# Patient Record
Sex: FEMALE | Race: WHITE | NOT HISPANIC OR LATINO
[De-identification: names, ages, dates, MRNs, and addresses within clinical notes are randomized per-mention and may not be internally consistent; named-entity substitution may affect disease eponyms.]

---

## 2017-06-09 ENCOUNTER — ASOB RESULT (OUTPATIENT)
Age: 27
End: 2017-06-09

## 2017-06-09 ENCOUNTER — APPOINTMENT (OUTPATIENT)
Dept: ANTEPARTUM | Facility: CLINIC | Age: 27
End: 2017-06-09

## 2017-06-22 ENCOUNTER — APPOINTMENT (OUTPATIENT)
Dept: ANTEPARTUM | Facility: CLINIC | Age: 27
End: 2017-06-22

## 2017-06-22 ENCOUNTER — ASOB RESULT (OUTPATIENT)
Age: 27
End: 2017-06-22

## 2017-07-07 ENCOUNTER — ASOB RESULT (OUTPATIENT)
Age: 27
End: 2017-07-07

## 2017-07-07 ENCOUNTER — APPOINTMENT (OUTPATIENT)
Dept: ANTEPARTUM | Facility: CLINIC | Age: 27
End: 2017-07-07

## 2017-07-21 ENCOUNTER — ASOB RESULT (OUTPATIENT)
Age: 27
End: 2017-07-21

## 2017-07-21 ENCOUNTER — APPOINTMENT (OUTPATIENT)
Dept: ANTEPARTUM | Facility: CLINIC | Age: 27
End: 2017-07-21

## 2017-08-04 ENCOUNTER — ASOB RESULT (OUTPATIENT)
Age: 27
End: 2017-08-04

## 2017-08-04 ENCOUNTER — APPOINTMENT (OUTPATIENT)
Dept: ANTEPARTUM | Facility: CLINIC | Age: 27
End: 2017-08-04
Payer: COMMERCIAL

## 2017-08-04 PROCEDURE — 76811 OB US DETAILED SNGL FETUS: CPT

## 2017-08-21 ENCOUNTER — APPOINTMENT (OUTPATIENT)
Dept: ANTEPARTUM | Facility: CLINIC | Age: 27
End: 2017-08-21
Payer: COMMERCIAL

## 2017-08-21 ENCOUNTER — ASOB RESULT (OUTPATIENT)
Age: 27
End: 2017-08-21

## 2017-08-21 PROCEDURE — 76816 OB US FOLLOW-UP PER FETUS: CPT

## 2017-08-21 PROCEDURE — 76821 MIDDLE CEREBRAL ARTERY ECHO: CPT

## 2017-10-30 ENCOUNTER — ASOB RESULT (OUTPATIENT)
Age: 27
End: 2017-10-30

## 2017-10-30 ENCOUNTER — APPOINTMENT (OUTPATIENT)
Dept: ANTEPARTUM | Facility: CLINIC | Age: 27
End: 2017-10-30
Payer: COMMERCIAL

## 2017-10-30 PROCEDURE — 76821 MIDDLE CEREBRAL ARTERY ECHO: CPT

## 2017-10-30 PROCEDURE — 76816 OB US FOLLOW-UP PER FETUS: CPT

## 2017-11-21 ENCOUNTER — ASOB RESULT (OUTPATIENT)
Age: 27
End: 2017-11-21

## 2017-11-21 ENCOUNTER — APPOINTMENT (OUTPATIENT)
Dept: ANTEPARTUM | Facility: CLINIC | Age: 27
End: 2017-11-21
Payer: COMMERCIAL

## 2017-11-21 PROCEDURE — 76816 OB US FOLLOW-UP PER FETUS: CPT

## 2017-11-21 PROCEDURE — 76818 FETAL BIOPHYS PROFILE W/NST: CPT

## 2017-11-27 ENCOUNTER — APPOINTMENT (OUTPATIENT)
Dept: ANTEPARTUM | Facility: CLINIC | Age: 27
End: 2017-11-27

## 2017-12-12 ENCOUNTER — APPOINTMENT (OUTPATIENT)
Dept: ANTEPARTUM | Facility: CLINIC | Age: 27
End: 2017-12-12
Payer: COMMERCIAL

## 2017-12-12 ENCOUNTER — ASOB RESULT (OUTPATIENT)
Age: 27
End: 2017-12-12

## 2017-12-12 PROCEDURE — 76816 OB US FOLLOW-UP PER FETUS: CPT

## 2017-12-12 PROCEDURE — 76819 FETAL BIOPHYS PROFIL W/O NST: CPT

## 2017-12-16 ENCOUNTER — OUTPATIENT (OUTPATIENT)
Dept: OUTPATIENT SERVICES | Facility: HOSPITAL | Age: 27
LOS: 1 days | End: 2017-12-16
Payer: COMMERCIAL

## 2017-12-16 DIAGNOSIS — O26.899 OTHER SPECIFIED PREGNANCY RELATED CONDITIONS, UNSPECIFIED TRIMESTER: ICD-10-CM

## 2017-12-16 DIAGNOSIS — Z3A.00 WEEKS OF GESTATION OF PREGNANCY NOT SPECIFIED: ICD-10-CM

## 2017-12-16 LAB
ALBUMIN SERPL ELPH-MCNC: 3.3 G/DL — SIGNIFICANT CHANGE UP (ref 3.3–5)
ALP SERPL-CCNC: 97 U/L — SIGNIFICANT CHANGE UP (ref 40–120)
ALT FLD-CCNC: 10 U/L RC — SIGNIFICANT CHANGE UP (ref 10–45)
ANION GAP SERPL CALC-SCNC: 15 MMOL/L — SIGNIFICANT CHANGE UP (ref 5–17)
APPEARANCE UR: CLEAR — SIGNIFICANT CHANGE UP
APTT BLD: 27 SEC — LOW (ref 27.5–37.4)
AST SERPL-CCNC: 16 U/L — SIGNIFICANT CHANGE UP (ref 10–40)
BACTERIA # UR AUTO: ABNORMAL /HPF
BASOPHILS # BLD AUTO: 0 K/UL — SIGNIFICANT CHANGE UP (ref 0–0.2)
BASOPHILS NFR BLD AUTO: 0.3 % — SIGNIFICANT CHANGE UP (ref 0–2)
BILIRUB SERPL-MCNC: 0.1 MG/DL — LOW (ref 0.2–1.2)
BILIRUB UR-MCNC: NEGATIVE — SIGNIFICANT CHANGE UP
BUN SERPL-MCNC: 8 MG/DL — SIGNIFICANT CHANGE UP (ref 7–23)
CALCIUM SERPL-MCNC: 8.7 MG/DL — SIGNIFICANT CHANGE UP (ref 8.4–10.5)
CHLORIDE SERPL-SCNC: 104 MMOL/L — SIGNIFICANT CHANGE UP (ref 96–108)
CO2 SERPL-SCNC: 21 MMOL/L — LOW (ref 22–31)
COLOR SPEC: YELLOW — SIGNIFICANT CHANGE UP
CREAT SERPL-MCNC: 0.66 MG/DL — SIGNIFICANT CHANGE UP (ref 0.5–1.3)
DIFF PNL FLD: NEGATIVE — SIGNIFICANT CHANGE UP
EOSINOPHIL # BLD AUTO: 0.1 K/UL — SIGNIFICANT CHANGE UP (ref 0–0.5)
EOSINOPHIL NFR BLD AUTO: 0.9 % — SIGNIFICANT CHANGE UP (ref 0–6)
EPI CELLS # UR: SIGNIFICANT CHANGE UP /HPF
FIBRINOGEN PPP-MCNC: 900 MG/DL — HIGH (ref 310–510)
GLUCOSE SERPL-MCNC: 85 MG/DL — SIGNIFICANT CHANGE UP (ref 70–99)
GLUCOSE UR QL: NEGATIVE — SIGNIFICANT CHANGE UP
HCT VFR BLD CALC: 31 % — LOW (ref 34.5–45)
HGB BLD-MCNC: 10.5 G/DL — LOW (ref 11.5–15.5)
INR BLD: 1.15 RATIO — SIGNIFICANT CHANGE UP (ref 0.88–1.16)
KETONES UR-MCNC: NEGATIVE — SIGNIFICANT CHANGE UP
LDH SERPL L TO P-CCNC: 159 U/L — SIGNIFICANT CHANGE UP (ref 50–242)
LEUKOCYTE ESTERASE UR-ACNC: ABNORMAL
LYMPHOCYTES # BLD AUTO: 16.9 % — SIGNIFICANT CHANGE UP (ref 13–44)
LYMPHOCYTES # BLD AUTO: 2 K/UL — SIGNIFICANT CHANGE UP (ref 1–3.3)
MCHC RBC-ENTMCNC: 26 PG — LOW (ref 27–34)
MCHC RBC-ENTMCNC: 33.9 GM/DL — SIGNIFICANT CHANGE UP (ref 32–36)
MCV RBC AUTO: 76.6 FL — LOW (ref 80–100)
MONOCYTES # BLD AUTO: 1 K/UL — HIGH (ref 0–0.9)
MONOCYTES NFR BLD AUTO: 8.2 % — SIGNIFICANT CHANGE UP (ref 2–14)
NEUTROPHILS # BLD AUTO: 8.6 K/UL — HIGH (ref 1.8–7.4)
NEUTROPHILS NFR BLD AUTO: 73.7 % — SIGNIFICANT CHANGE UP (ref 43–77)
NITRITE UR-MCNC: NEGATIVE — SIGNIFICANT CHANGE UP
PH UR: 6.5 — SIGNIFICANT CHANGE UP (ref 5–8)
PLATELET # BLD AUTO: 279 K/UL — SIGNIFICANT CHANGE UP (ref 150–400)
POTASSIUM SERPL-MCNC: 3.8 MMOL/L — SIGNIFICANT CHANGE UP (ref 3.5–5.3)
POTASSIUM SERPL-SCNC: 3.8 MMOL/L — SIGNIFICANT CHANGE UP (ref 3.5–5.3)
PROT SERPL-MCNC: 6.6 G/DL — SIGNIFICANT CHANGE UP (ref 6–8.3)
PROT UR-MCNC: SIGNIFICANT CHANGE UP
PROTHROM AB SERPL-ACNC: 12.6 SEC — SIGNIFICANT CHANGE UP (ref 9.8–12.7)
RBC # BLD: 4.05 M/UL — SIGNIFICANT CHANGE UP (ref 3.8–5.2)
RBC # FLD: 14 % — SIGNIFICANT CHANGE UP (ref 10.3–14.5)
RBC CASTS # UR COMP ASSIST: ABNORMAL /HPF (ref 0–2)
SODIUM SERPL-SCNC: 140 MMOL/L — SIGNIFICANT CHANGE UP (ref 135–145)
SP GR SPEC: 1.01 — SIGNIFICANT CHANGE UP (ref 1.01–1.02)
URATE SERPL-MCNC: 5.2 MG/DL — SIGNIFICANT CHANGE UP (ref 2.5–7)
UROBILINOGEN FLD QL: NEGATIVE — SIGNIFICANT CHANGE UP
WBC # BLD: 11.7 K/UL — HIGH (ref 3.8–10.5)
WBC # FLD AUTO: 11.7 K/UL — HIGH (ref 3.8–10.5)
WBC UR QL: ABNORMAL /HPF (ref 0–5)

## 2017-12-16 PROCEDURE — 83615 LACTATE (LD) (LDH) ENZYME: CPT

## 2017-12-16 PROCEDURE — 85027 COMPLETE CBC AUTOMATED: CPT

## 2017-12-16 PROCEDURE — 84156 ASSAY OF PROTEIN URINE: CPT

## 2017-12-16 PROCEDURE — G0463: CPT

## 2017-12-16 PROCEDURE — 59025 FETAL NON-STRESS TEST: CPT

## 2017-12-16 PROCEDURE — 81001 URINALYSIS AUTO W/SCOPE: CPT

## 2017-12-16 PROCEDURE — 80053 COMPREHEN METABOLIC PANEL: CPT

## 2017-12-16 PROCEDURE — 85384 FIBRINOGEN ACTIVITY: CPT

## 2017-12-16 PROCEDURE — 85610 PROTHROMBIN TIME: CPT

## 2017-12-16 PROCEDURE — 85730 THROMBOPLASTIN TIME PARTIAL: CPT

## 2017-12-16 PROCEDURE — 84550 ASSAY OF BLOOD/URIC ACID: CPT

## 2017-12-17 ENCOUNTER — INPATIENT (INPATIENT)
Facility: HOSPITAL | Age: 27
LOS: 2 days | Discharge: HOME | End: 2017-12-20
Attending: OBSTETRICS & GYNECOLOGY | Admitting: OBSTETRICS & GYNECOLOGY
Payer: COMMERCIAL

## 2017-12-17 DIAGNOSIS — Z34.83 ENCOUNTER FOR SUPERVISION OF OTHER NORMAL PREGNANCY, THIRD TRIMESTER: ICD-10-CM

## 2017-12-17 LAB
CREAT ?TM UR-MCNC: 79 MG/DL — SIGNIFICANT CHANGE UP
PROT ?TM UR-MCNC: 10 MG/DL — SIGNIFICANT CHANGE UP (ref 0–12)
PROT/CREAT UR-RTO: 0.1 RATIO — SIGNIFICANT CHANGE UP (ref 0–0.2)

## 2017-12-17 RX ORDER — CITRIC ACID/SODIUM CITRATE 300-500 MG
15 SOLUTION, ORAL ORAL EVERY 4 HOURS
Qty: 0 | Refills: 0 | Status: DISCONTINUED | OUTPATIENT
Start: 2017-12-17 | End: 2017-12-18

## 2017-12-17 RX ORDER — SODIUM CHLORIDE 9 MG/ML
2500 INJECTION, SOLUTION INTRAVENOUS ONCE
Qty: 0 | Refills: 0 | Status: COMPLETED | OUTPATIENT
Start: 2017-12-17 | End: 2017-12-17

## 2017-12-17 RX ORDER — SODIUM CHLORIDE 9 MG/ML
1000 INJECTION, SOLUTION INTRAVENOUS
Qty: 0 | Refills: 0 | Status: DISCONTINUED | OUTPATIENT
Start: 2017-12-17 | End: 2017-12-18

## 2017-12-17 RX ORDER — OXYTOCIN 10 UNIT/ML
333.33 VIAL (ML) INJECTION
Qty: 20 | Refills: 0 | Status: COMPLETED | OUTPATIENT
Start: 2017-12-17

## 2017-12-17 RX ORDER — SODIUM CHLORIDE 9 MG/ML
1000 INJECTION, SOLUTION INTRAVENOUS
Qty: 0 | Refills: 0 | Status: DISCONTINUED | OUTPATIENT
Start: 2017-12-17 | End: 2017-12-20

## 2017-12-17 RX ADMIN — SODIUM CHLORIDE 300 MILLILITER(S): 9 INJECTION, SOLUTION INTRAVENOUS at 23:00

## 2017-12-18 VITALS — HEIGHT: 63 IN | WEIGHT: 202.83 LBS

## 2017-12-18 LAB
BLD GP AB SCN SERPL QL: NEGATIVE — SIGNIFICANT CHANGE UP
HCT VFR BLD CALC: 28.1 % — LOW (ref 34.5–45)
HGB BLD-MCNC: 9.5 G/DL — LOW (ref 11.5–15.5)
MCHC RBC-ENTMCNC: 26.1 PG — LOW (ref 27–34)
MCHC RBC-ENTMCNC: 33.9 GM/DL — SIGNIFICANT CHANGE UP (ref 32–36)
MCV RBC AUTO: 77.1 FL — LOW (ref 80–100)
PLATELET # BLD AUTO: 238 K/UL — SIGNIFICANT CHANGE UP (ref 150–400)
RBC # BLD: 3.64 M/UL — LOW (ref 3.8–5.2)
RBC # FLD: 14.2 % — SIGNIFICANT CHANGE UP (ref 10.3–14.5)
RH IG SCN BLD-IMP: NEGATIVE — SIGNIFICANT CHANGE UP
T PALLIDUM AB TITR SER: NEGATIVE — SIGNIFICANT CHANGE UP
WBC # BLD: 11.8 K/UL — HIGH (ref 3.8–10.5)
WBC # FLD AUTO: 11.8 K/UL — HIGH (ref 3.8–10.5)

## 2017-12-18 RX ORDER — HYDROCORTISONE 1 %
1 OINTMENT (GRAM) TOPICAL EVERY 4 HOURS
Qty: 0 | Refills: 0 | Status: DISCONTINUED | OUTPATIENT
Start: 2017-12-18 | End: 2017-12-20

## 2017-12-18 RX ORDER — MAGNESIUM HYDROXIDE 400 MG/1
30 TABLET, CHEWABLE ORAL
Qty: 0 | Refills: 0 | Status: DISCONTINUED | OUTPATIENT
Start: 2017-12-18 | End: 2017-12-20

## 2017-12-18 RX ORDER — OXYCODONE HYDROCHLORIDE 5 MG/1
5 TABLET ORAL
Qty: 0 | Refills: 0 | Status: DISCONTINUED | OUTPATIENT
Start: 2017-12-18 | End: 2017-12-20

## 2017-12-18 RX ORDER — OXYTOCIN 10 UNIT/ML
41.67 VIAL (ML) INJECTION
Qty: 20 | Refills: 0 | Status: DISCONTINUED | OUTPATIENT
Start: 2017-12-18 | End: 2017-12-18

## 2017-12-18 RX ORDER — IBUPROFEN 200 MG
600 TABLET ORAL EVERY 6 HOURS
Qty: 0 | Refills: 0 | Status: DISCONTINUED | OUTPATIENT
Start: 2017-12-18 | End: 2017-12-20

## 2017-12-18 RX ORDER — OXYTOCIN 10 UNIT/ML
4 VIAL (ML) INJECTION
Qty: 30 | Refills: 0 | Status: DISCONTINUED | OUTPATIENT
Start: 2017-12-18 | End: 2017-12-20

## 2017-12-18 RX ORDER — SODIUM CHLORIDE 9 MG/ML
3 INJECTION INTRAMUSCULAR; INTRAVENOUS; SUBCUTANEOUS EVERY 8 HOURS
Qty: 0 | Refills: 0 | Status: DISCONTINUED | OUTPATIENT
Start: 2017-12-18 | End: 2017-12-18

## 2017-12-18 RX ORDER — TETANUS TOXOID, REDUCED DIPHTHERIA TOXOID AND ACELLULAR PERTUSSIS VACCINE, ADSORBED 5; 2.5; 8; 8; 2.5 [IU]/.5ML; [IU]/.5ML; UG/.5ML; UG/.5ML; UG/.5ML
0.5 SUSPENSION INTRAMUSCULAR ONCE
Qty: 0 | Refills: 0 | Status: DISCONTINUED | OUTPATIENT
Start: 2017-12-18 | End: 2017-12-20

## 2017-12-18 RX ORDER — DIBUCAINE 1 %
1 OINTMENT (GRAM) RECTAL EVERY 4 HOURS
Qty: 0 | Refills: 0 | Status: DISCONTINUED | OUTPATIENT
Start: 2017-12-18 | End: 2017-12-18

## 2017-12-18 RX ORDER — LANOLIN
1 OINTMENT (GRAM) TOPICAL EVERY 6 HOURS
Qty: 0 | Refills: 0 | Status: DISCONTINUED | OUTPATIENT
Start: 2017-12-18 | End: 2017-12-20

## 2017-12-18 RX ORDER — GLYCERIN ADULT
1 SUPPOSITORY, RECTAL RECTAL AT BEDTIME
Qty: 0 | Refills: 0 | Status: DISCONTINUED | OUTPATIENT
Start: 2017-12-18 | End: 2017-12-20

## 2017-12-18 RX ORDER — DIPHENHYDRAMINE HCL 50 MG
25 CAPSULE ORAL EVERY 6 HOURS
Qty: 0 | Refills: 0 | Status: DISCONTINUED | OUTPATIENT
Start: 2017-12-18 | End: 2017-12-20

## 2017-12-18 RX ORDER — KETOROLAC TROMETHAMINE 30 MG/ML
30 SYRINGE (ML) INJECTION ONCE
Qty: 0 | Refills: 0 | Status: DISCONTINUED | OUTPATIENT
Start: 2017-12-18 | End: 2017-12-20

## 2017-12-18 RX ORDER — ACETAMINOPHEN 500 MG
975 TABLET ORAL EVERY 6 HOURS
Qty: 0 | Refills: 0 | Status: DISCONTINUED | OUTPATIENT
Start: 2017-12-18 | End: 2017-12-20

## 2017-12-18 RX ORDER — OXYTOCIN 10 UNIT/ML
333.33 VIAL (ML) INJECTION
Qty: 20 | Refills: 0 | Status: DISCONTINUED | OUTPATIENT
Start: 2017-12-18 | End: 2017-12-20

## 2017-12-18 RX ORDER — SIMETHICONE 80 MG/1
80 TABLET, CHEWABLE ORAL EVERY 6 HOURS
Qty: 0 | Refills: 0 | Status: DISCONTINUED | OUTPATIENT
Start: 2017-12-18 | End: 2017-12-20

## 2017-12-18 RX ORDER — SODIUM CHLORIDE 9 MG/ML
3 INJECTION INTRAMUSCULAR; INTRAVENOUS; SUBCUTANEOUS EVERY 8 HOURS
Qty: 0 | Refills: 0 | Status: DISCONTINUED | OUTPATIENT
Start: 2017-12-18 | End: 2017-12-20

## 2017-12-18 RX ORDER — OXYCODONE HYDROCHLORIDE 5 MG/1
5 TABLET ORAL EVERY 4 HOURS
Qty: 0 | Refills: 0 | Status: DISCONTINUED | OUTPATIENT
Start: 2017-12-18 | End: 2017-12-20

## 2017-12-18 RX ORDER — IBUPROFEN 200 MG
600 TABLET ORAL EVERY 6 HOURS
Qty: 0 | Refills: 0 | Status: COMPLETED | OUTPATIENT
Start: 2017-12-18 | End: 2018-11-16

## 2017-12-18 RX ORDER — ACETAMINOPHEN 500 MG
975 TABLET ORAL EVERY 6 HOURS
Qty: 0 | Refills: 0 | Status: COMPLETED | OUTPATIENT
Start: 2017-12-18 | End: 2018-11-16

## 2017-12-18 RX ORDER — PRAMOXINE HYDROCHLORIDE 150 MG/15G
1 AEROSOL, FOAM RECTAL EVERY 4 HOURS
Qty: 0 | Refills: 0 | Status: DISCONTINUED | OUTPATIENT
Start: 2017-12-18 | End: 2017-12-20

## 2017-12-18 RX ORDER — PRAMOXINE HYDROCHLORIDE 150 MG/15G
1 AEROSOL, FOAM RECTAL EVERY 4 HOURS
Qty: 0 | Refills: 0 | Status: DISCONTINUED | OUTPATIENT
Start: 2017-12-18 | End: 2017-12-18

## 2017-12-18 RX ORDER — DIBUCAINE 1 %
1 OINTMENT (GRAM) RECTAL EVERY 4 HOURS
Qty: 0 | Refills: 0 | Status: DISCONTINUED | OUTPATIENT
Start: 2017-12-18 | End: 2017-12-20

## 2017-12-18 RX ORDER — DOCUSATE SODIUM 100 MG
100 CAPSULE ORAL
Qty: 0 | Refills: 0 | Status: DISCONTINUED | OUTPATIENT
Start: 2017-12-18 | End: 2017-12-20

## 2017-12-18 RX ORDER — HYDROCORTISONE 1 %
1 OINTMENT (GRAM) TOPICAL EVERY 4 HOURS
Qty: 0 | Refills: 0 | Status: DISCONTINUED | OUTPATIENT
Start: 2017-12-18 | End: 2017-12-18

## 2017-12-18 RX ORDER — OXYTOCIN 10 UNIT/ML
41.67 VIAL (ML) INJECTION
Qty: 20 | Refills: 0 | Status: DISCONTINUED | OUTPATIENT
Start: 2017-12-18 | End: 2017-12-20

## 2017-12-18 RX ORDER — AER TRAVELER 0.5 G/1
1 SOLUTION RECTAL; TOPICAL EVERY 4 HOURS
Qty: 0 | Refills: 0 | Status: DISCONTINUED | OUTPATIENT
Start: 2017-12-18 | End: 2017-12-20

## 2017-12-18 RX ORDER — AER TRAVELER 0.5 G/1
1 SOLUTION RECTAL; TOPICAL EVERY 4 HOURS
Qty: 0 | Refills: 0 | Status: DISCONTINUED | OUTPATIENT
Start: 2017-12-18 | End: 2017-12-18

## 2017-12-18 RX ADMIN — SODIUM CHLORIDE 125 MILLILITER(S): 9 INJECTION, SOLUTION INTRAVENOUS at 04:52

## 2017-12-18 RX ADMIN — Medication 600 MILLIGRAM(S): at 20:39

## 2017-12-18 RX ADMIN — Medication 4 MILLIUNIT(S)/MIN: at 03:24

## 2017-12-18 RX ADMIN — Medication 600 MILLIGRAM(S): at 21:09

## 2017-12-18 RX ADMIN — Medication 975 MILLIGRAM(S): at 22:45

## 2017-12-18 RX ADMIN — Medication 975 MILLIGRAM(S): at 23:15

## 2017-12-19 LAB
HCT VFR BLD CALC: 31 % — LOW (ref 34.5–45)
HGB BLD-MCNC: 9.7 G/DL — LOW (ref 11.5–15.5)
KLEIHAUER-BETKE CALCULATION: 0 % — SIGNIFICANT CHANGE UP (ref 0–0.3)

## 2017-12-19 PROCEDURE — 86901 BLOOD TYPING SEROLOGIC RH(D): CPT

## 2017-12-19 PROCEDURE — 86900 BLOOD TYPING SEROLOGIC ABO: CPT

## 2017-12-19 PROCEDURE — 86850 RBC ANTIBODY SCREEN: CPT

## 2017-12-19 PROCEDURE — 59025 FETAL NON-STRESS TEST: CPT

## 2017-12-19 PROCEDURE — 59050 FETAL MONITOR W/REPORT: CPT

## 2017-12-19 PROCEDURE — 85460 HEMOGLOBIN FETAL: CPT

## 2017-12-19 PROCEDURE — 86780 TREPONEMA PALLIDUM: CPT

## 2017-12-19 PROCEDURE — 85027 COMPLETE CBC AUTOMATED: CPT

## 2017-12-19 PROCEDURE — 85018 HEMOGLOBIN: CPT

## 2017-12-19 RX ADMIN — Medication 600 MILLIGRAM(S): at 10:33

## 2017-12-19 RX ADMIN — Medication 1 TABLET(S): at 12:14

## 2017-12-19 RX ADMIN — Medication 600 MILLIGRAM(S): at 11:15

## 2017-12-19 RX ADMIN — Medication 975 MILLIGRAM(S): at 08:15

## 2017-12-19 RX ADMIN — Medication 975 MILLIGRAM(S): at 07:36

## 2017-12-19 NOTE — PROGRESS NOTE ADULT - ASSESSMENT
A/P:  27y  PPD # 1   S/P     Doing Well    PMHx: cerivcal dysplasia  Current Issues: A/P:  27y  PPD # 1   S/P     Doing Well    PMHx: cerivcal dysplasia  Current Issues: Rh neg, Babt Rh pos, check KB, for RHogam

## 2017-12-19 NOTE — PROGRESS NOTE ADULT - SUBJECTIVE AND OBJECTIVE BOX
Postpartum Note- PPD#1    Prenatal Labs  Blood type: A Negative  Rubella IgG: IMM  RPR: Negative        S:Patient w/o complaints, pain is controlled.    Pt is OOB, tolerating PO, voiding. Lochia WNL.     O:  Vital Signs Last 24 Hrs  T(C): 36.6 (19 Dec 2017 05:46), Max: 36.9 (18 Dec 2017 17:45)  T(F): 97.9 (19 Dec 2017 05:46), Max: 98.4 (18 Dec 2017 17:45)  HR: 70 (19 Dec 2017 05:46) (70 - 82)  BP: 101/69 (19 Dec 2017 05:46) (1/- - 131/70)  BP(mean): 92 (18 Dec 2017 15:45) (85 - 92)  RR: 18 (19 Dec 2017 05:46) (16 - 18)  SpO2: 99% (19 Dec 2017 05:46) (96% - 99%)     Gen: NAD  Abdomen: Soft, nontender, non-distended, fundus firm.  Vaginal: Lochia WNL,    Ext:  Neg calf tenderness    LABS:    Hemoglobin: 9.5 g/dL (12-18 @ 00:28)      Hematocrit: 28.1 % (12-18 @ 00:28)

## 2017-12-20 ENCOUNTER — TRANSCRIPTION ENCOUNTER (OUTPATIENT)
Age: 27
End: 2017-12-20

## 2017-12-20 VITALS
SYSTOLIC BLOOD PRESSURE: 128 MMHG | HEART RATE: 76 BPM | DIASTOLIC BLOOD PRESSURE: 81 MMHG | TEMPERATURE: 98 F | RESPIRATION RATE: 18 BRPM

## 2017-12-20 RX ORDER — DOCUSATE SODIUM 100 MG
1 CAPSULE ORAL
Qty: 0 | Refills: 0 | DISCHARGE
Start: 2017-12-20

## 2017-12-20 RX ORDER — IBUPROFEN 200 MG
1 TABLET ORAL
Qty: 0 | Refills: 0 | DISCHARGE
Start: 2017-12-20

## 2017-12-20 RX ORDER — SIMETHICONE 80 MG/1
1 TABLET, CHEWABLE ORAL
Qty: 0 | Refills: 0 | DISCHARGE
Start: 2017-12-20

## 2017-12-20 RX ORDER — ACETAMINOPHEN 500 MG
3 TABLET ORAL
Qty: 0 | Refills: 0 | DISCHARGE
Start: 2017-12-20

## 2017-12-20 NOTE — DISCHARGE NOTE OB - HOSPITAL COURSE
Pt had an uncomplicated vaginal delivery; Pt did well postpartum; she was voiding, ambulating and tolerating regular diet; she remained afebrile and her pain was well controlled  Pt was discharged on PPD 2 in stable condition

## 2017-12-20 NOTE — DISCHARGE NOTE OB - PLAN OF CARE
recovery diet and activity as discussed and tolerated; please call office to schedule postpartum visit 5 weeks after delivery or earlier if needed

## 2017-12-20 NOTE — DISCHARGE NOTE OB - CARE PLAN
Principal Discharge DX:	Vaginal delivery  Goal:	recovery  Instructions for follow-up, activity and diet:	diet and activity as discussed and tolerated; please call office to schedule postpartum visit 5 weeks after delivery or earlier if needed

## 2017-12-20 NOTE — PROGRESS NOTE ADULT - SUBJECTIVE AND OBJECTIVE BOX
S: Patient doing well. Minimal lochia. Pain controlled    O: Vital Signs Last 24 Hrs  T(C): 36.7 (20 Dec 2017 05:00), Max: 36.7 (19 Dec 2017 17:44)  T(F): 98.1 (20 Dec 2017 05:00), Max: 98.1 (20 Dec 2017 05:00)  HR: 76 (20 Dec 2017 05:00) (66 - 76)  BP: 128/81 (20 Dec 2017 05:00) (118/81 - 128/81)  BP(mean): --  RR: 18 (20 Dec 2017 05:00) (16 - 18)  SpO2: --    Gen: NAD  Abd: soft, NT, ND, fundus firm below umbilicus  Lochia: moderate  Ext: no tenderness    Labs:                        9.7    x     )-----------( x        ( 19 Dec 2017 08:49 )             31.0       A: 27y PPD#2 s/p  doing well.    Plan:  Pt doing well on PPD 2  routine PP care  discharge planning

## 2017-12-20 NOTE — DISCHARGE NOTE OB - CARE PROVIDER_API CALL
Vish Patel), Obstetrics and Gynecology  [address] [place] [address]  Phone: [phone]  Fax: [phone]

## 2020-03-19 ENCOUNTER — TRANSCRIPTION ENCOUNTER (OUTPATIENT)
Age: 30
End: 2020-03-19

## 2020-03-19 ENCOUNTER — OUTPATIENT (OUTPATIENT)
Dept: OUTPATIENT SERVICES | Facility: HOSPITAL | Age: 30
LOS: 1 days | End: 2020-03-19
Payer: COMMERCIAL

## 2020-03-19 VITALS
WEIGHT: 194.01 LBS | TEMPERATURE: 99 F | OXYGEN SATURATION: 99 % | DIASTOLIC BLOOD PRESSURE: 74 MMHG | HEART RATE: 86 BPM | SYSTOLIC BLOOD PRESSURE: 112 MMHG | RESPIRATION RATE: 16 BRPM | HEIGHT: 64 IN

## 2020-03-19 DIAGNOSIS — O02.1 MISSED ABORTION: ICD-10-CM

## 2020-03-19 LAB
BLD GP AB SCN SERPL QL: NEGATIVE — SIGNIFICANT CHANGE UP
HCT VFR BLD CALC: 36.9 % — SIGNIFICANT CHANGE UP (ref 34.5–45)
HGB BLD-MCNC: 11.7 G/DL — SIGNIFICANT CHANGE UP (ref 11.5–15.5)
MCHC RBC-ENTMCNC: 25.6 PG — LOW (ref 27–34)
MCHC RBC-ENTMCNC: 31.7 GM/DL — LOW (ref 32–36)
MCV RBC AUTO: 80.7 FL — SIGNIFICANT CHANGE UP (ref 80–100)
NRBC # BLD: 0 /100 WBCS — SIGNIFICANT CHANGE UP (ref 0–0)
PLATELET # BLD AUTO: 321 K/UL — SIGNIFICANT CHANGE UP (ref 150–400)
RBC # BLD: 4.57 M/UL — SIGNIFICANT CHANGE UP (ref 3.8–5.2)
RBC # FLD: 14.2 % — SIGNIFICANT CHANGE UP (ref 10.3–14.5)
RH IG SCN BLD-IMP: NEGATIVE — SIGNIFICANT CHANGE UP
WBC # BLD: 11.14 K/UL — HIGH (ref 3.8–10.5)
WBC # FLD AUTO: 11.14 K/UL — HIGH (ref 3.8–10.5)

## 2020-03-19 PROCEDURE — 86900 BLOOD TYPING SEROLOGIC ABO: CPT

## 2020-03-19 PROCEDURE — 86850 RBC ANTIBODY SCREEN: CPT

## 2020-03-19 PROCEDURE — 85027 COMPLETE CBC AUTOMATED: CPT

## 2020-03-19 PROCEDURE — G0463: CPT

## 2020-03-19 PROCEDURE — 86901 BLOOD TYPING SEROLOGIC RH(D): CPT

## 2020-03-19 RX ORDER — LIDOCAINE HCL 20 MG/ML
0.2 VIAL (ML) INJECTION ONCE
Refills: 0 | Status: DISCONTINUED | OUTPATIENT
Start: 2020-03-20 | End: 2020-04-04

## 2020-03-19 RX ORDER — SODIUM CHLORIDE 9 MG/ML
3 INJECTION INTRAMUSCULAR; INTRAVENOUS; SUBCUTANEOUS EVERY 8 HOURS
Refills: 0 | Status: DISCONTINUED | OUTPATIENT
Start: 2020-03-20 | End: 2020-04-04

## 2020-03-19 NOTE — H&P PST ADULT - NSANTHOSAYNRD_GEN_A_CORE
No. YOANDY screening performed.  STOP BANG Legend: 0-2 = LOW Risk; 3-4 = INTERMEDIATE Risk; 5-8 = HIGH Risk

## 2020-03-19 NOTE — H&P PST ADULT - HISTORY OF PRESENT ILLNESS
30 yo 30 yo presenting @ 10 weeks of gestation- had OB evaluation- s/p pelvic sonogram revealed absence of FHR- missed . Pt scheduled for D&C for missed  on 2020. 28 yo F presenting @ 10 weeks of gestation- had OB evaluation- s/p pelvic sonogram revealed absence of FHR- missed . Pt scheduled for D&C for missed  on 2020.

## 2020-03-19 NOTE — H&P PST ADULT - ATTENDING COMMENTS
Ob/Gyn Attg  Pt is scheduled to undergo D+C/vacuum curettage for missed ab in first trimester.  Pt was counseled re op risks and benefits and poss complications.  Specifically, risks of infection, excessive bleeding, uterine perforation, injury to the cervix, uterus or intra-abd organs or risk of incomplete empyting of uterine contents and need for further surgery or treatment discussed.  Also discussed postop mgmt, pain mgmt, recovery.  all q's answered.  Pt understands and accepts.

## 2020-03-19 NOTE — H&P PST ADULT - VISION (WITH CORRECTIVE LENSES IF THE PATIENT USUALLY WEARS THEM):
Normal vision: sees adequately in most situations; can see medication labels, newsprint/wears contact glasses

## 2020-03-19 NOTE — H&P PST ADULT - NSICDXFAMILYHX_GEN_ALL_CORE_FT
FAMILY HISTORY:  Family history of colon cancer in mother  Family history of hypertension  Family history of polycystic kidney disease

## 2020-03-19 NOTE — H&P PST ADULT - NSICDXPROBLEM_GEN_ALL_CORE_FT
PROBLEM DIAGNOSES  Problem: Missed   Assessment and Plan: Dilation & suction curettage for missed    Labs- CBC, T&S

## 2020-03-20 ENCOUNTER — OUTPATIENT (OUTPATIENT)
Dept: OUTPATIENT SERVICES | Facility: HOSPITAL | Age: 30
LOS: 1 days | End: 2020-03-20
Payer: COMMERCIAL

## 2020-03-20 VITALS
RESPIRATION RATE: 14 BRPM | SYSTOLIC BLOOD PRESSURE: 110 MMHG | OXYGEN SATURATION: 98 % | DIASTOLIC BLOOD PRESSURE: 71 MMHG | HEART RATE: 75 BPM

## 2020-03-20 VITALS
HEART RATE: 76 BPM | OXYGEN SATURATION: 100 % | RESPIRATION RATE: 16 BRPM | SYSTOLIC BLOOD PRESSURE: 109 MMHG | HEIGHT: 64 IN | WEIGHT: 194.01 LBS | DIASTOLIC BLOOD PRESSURE: 71 MMHG | TEMPERATURE: 99 F

## 2020-03-20 DIAGNOSIS — O02.1 MISSED ABORTION: ICD-10-CM

## 2020-03-20 DIAGNOSIS — Z98.890 OTHER SPECIFIED POSTPROCEDURAL STATES: Chronic | ICD-10-CM

## 2020-03-20 PROCEDURE — 88305 TISSUE EXAM BY PATHOLOGIST: CPT | Mod: 26

## 2020-03-20 PROCEDURE — 88305 TISSUE EXAM BY PATHOLOGIST: CPT

## 2020-03-20 PROCEDURE — 88264 CHROMOSOME ANALYSIS 20-25: CPT

## 2020-03-20 PROCEDURE — 59820 CARE OF MISCARRIAGE: CPT

## 2020-03-20 PROCEDURE — 88280 CHROMOSOME KARYOTYPE STUDY: CPT

## 2020-03-20 PROCEDURE — 88233 TISSUE CULTURE SKIN/BIOPSY: CPT

## 2020-03-20 RX ORDER — IBUPROFEN 200 MG
3 TABLET ORAL
Qty: 0 | Refills: 0 | DISCHARGE

## 2020-03-20 RX ORDER — OXYCODONE HYDROCHLORIDE 5 MG/1
5 TABLET ORAL ONCE
Refills: 0 | Status: DISCONTINUED | OUTPATIENT
Start: 2020-03-20 | End: 2020-03-20

## 2020-03-20 RX ORDER — ACETAMINOPHEN 500 MG
1000 TABLET ORAL ONCE
Refills: 0 | Status: COMPLETED | OUTPATIENT
Start: 2020-03-20 | End: 2020-03-20

## 2020-03-20 RX ORDER — CELECOXIB 200 MG/1
200 CAPSULE ORAL ONCE
Refills: 0 | Status: COMPLETED | OUTPATIENT
Start: 2020-03-20 | End: 2020-03-20

## 2020-03-20 RX ORDER — ONDANSETRON 8 MG/1
4 TABLET, FILM COATED ORAL ONCE
Refills: 0 | Status: DISCONTINUED | OUTPATIENT
Start: 2020-03-20 | End: 2020-04-04

## 2020-03-20 RX ORDER — SODIUM CHLORIDE 9 MG/ML
1000 INJECTION, SOLUTION INTRAVENOUS
Refills: 0 | Status: DISCONTINUED | OUTPATIENT
Start: 2020-03-20 | End: 2020-04-04

## 2020-03-20 RX ORDER — FENTANYL CITRATE 50 UG/ML
25 INJECTION INTRAVENOUS
Refills: 0 | Status: DISCONTINUED | OUTPATIENT
Start: 2020-03-20 | End: 2020-03-20

## 2020-03-20 RX ADMIN — Medication 1000 MILLIGRAM(S): at 06:25

## 2020-03-20 RX ADMIN — SODIUM CHLORIDE 100 MILLILITER(S): 9 INJECTION, SOLUTION INTRAVENOUS at 08:08

## 2020-03-20 RX ADMIN — CELECOXIB 200 MILLIGRAM(S): 200 CAPSULE ORAL at 06:24

## 2020-03-20 NOTE — ASU DISCHARGE PLAN (ADULT/PEDIATRIC) - CARE PROVIDER_API CALL
Santos Giraldo)  Obstetrics and Gynecology  [address] [facility], [address]  [place] [address]  Phone: [phone]  Fax: [phone]  Follow Up Time:

## 2020-03-20 NOTE — ASU DISCHARGE PLAN (ADULT/PEDIATRIC) - ASU DISCHARGE DATE/TIME
Patient pleasant upon approach  Primarily [place] but does understand some [place]  Denies anxiety, depression, S/HI, A/VH and pain  Med and meal compliant  Visible and social with peers on the unit  Will continue to monitor and provide therapeutic support  20-Mar-2020 08:09

## 2020-03-20 NOTE — BRIEF OPERATIVE NOTE - OPERATION/FINDINGS
7wk size uterus.  appropriate amount of POC's for 6-7 wks seen passing through the suction tubing, no adnexal mass palpable

## 2020-03-20 NOTE — BRIEF OPERATIVE NOTE - NSICDXBRIEFPROCEDURE_GEN_ALL_CORE_FT
PROCEDURES:  D&C, uterus, therapeutic, for incomplete, missed, septic, or induced  20-Mar-2020 07:48:36  Santos Giraldo

## 2020-03-20 NOTE — ASU DISCHARGE PLAN (ADULT/PEDIATRIC) - CALL YOUR DOCTOR IF YOU HAVE ANY OF THE FOLLOWING:
Pain not relieved by Medications/Swelling that gets worse/Unable to urinate/Bleeding that does not stop/Fever greater than (need to indicate Fahrenheit or Celsius)/Inability to tolerate liquids or foods/Increased irritability or sluggishness

## 2020-03-20 NOTE — ASU PATIENT PROFILE, ADULT - VISION (WITH CORRECTIVE LENSES IF THE PATIENT USUALLY WEARS THEM):
wears contact glasses/Normal vision: sees adequately in most situations; can see medication labels, newsprint

## 2020-03-20 NOTE — ASU DISCHARGE PLAN (ADULT/PEDIATRIC) - ACTIVITY LEVEL
No excercise/No intercourse/No tub baths/No douching/No heavy lifting/No sports/gym/Nothing per vagina/No tampons

## 2020-03-23 LAB — SURGICAL PATHOLOGY STUDY: SIGNIFICANT CHANGE UP

## 2020-04-06 LAB — CHROM ANALY OVERALL INTERP SPEC-IMP: SIGNIFICANT CHANGE UP

## 2020-08-25 PROBLEM — F41.8 OTHER SPECIFIED ANXIETY DISORDERS: Chronic | Status: ACTIVE | Noted: 2020-03-19

## 2020-09-18 ENCOUNTER — ASOB RESULT (OUTPATIENT)
Age: 30
End: 2020-09-18

## 2020-09-18 ENCOUNTER — APPOINTMENT (OUTPATIENT)
Dept: ANTEPARTUM | Facility: CLINIC | Age: 30
End: 2020-09-18
Payer: COMMERCIAL

## 2020-09-18 PROCEDURE — 76801 OB US < 14 WKS SINGLE FETUS: CPT

## 2020-09-18 PROCEDURE — 76813 OB US NUCHAL MEAS 1 GEST: CPT

## 2020-10-20 ENCOUNTER — EMERGENCY (EMERGENCY)
Facility: HOSPITAL | Age: 30
LOS: 1 days | Discharge: HOME | End: 2020-10-20
Attending: STUDENT IN AN ORGANIZED HEALTH CARE EDUCATION/TRAINING PROGRAM
Payer: COMMERCIAL

## 2020-10-20 VITALS
TEMPERATURE: 98 F | SYSTOLIC BLOOD PRESSURE: 127 MMHG | DIASTOLIC BLOOD PRESSURE: 89 MMHG | HEART RATE: 86 BPM | OXYGEN SATURATION: 100 % | RESPIRATION RATE: 18 BRPM

## 2020-10-20 VITALS
HEART RATE: 97 BPM | TEMPERATURE: 98 F | DIASTOLIC BLOOD PRESSURE: 90 MMHG | WEIGHT: 199.96 LBS | RESPIRATION RATE: 18 BRPM | HEIGHT: 64 IN | SYSTOLIC BLOOD PRESSURE: 141 MMHG | OXYGEN SATURATION: 99 %

## 2020-10-20 DIAGNOSIS — Z98.890 OTHER SPECIFIED POSTPROCEDURAL STATES: Chronic | ICD-10-CM

## 2020-10-20 LAB
ALBUMIN SERPL ELPH-MCNC: 3.8 G/DL — SIGNIFICANT CHANGE UP (ref 3.3–5)
ALP SERPL-CCNC: 44 U/L — SIGNIFICANT CHANGE UP (ref 40–120)
ALT FLD-CCNC: 13 U/L — SIGNIFICANT CHANGE UP (ref 10–45)
ANION GAP SERPL CALC-SCNC: 12 MMOL/L — SIGNIFICANT CHANGE UP (ref 5–17)
AST SERPL-CCNC: 17 U/L — SIGNIFICANT CHANGE UP (ref 10–40)
BASOPHILS # BLD AUTO: 0.04 K/UL — SIGNIFICANT CHANGE UP (ref 0–0.2)
BASOPHILS NFR BLD AUTO: 0.3 % — SIGNIFICANT CHANGE UP (ref 0–2)
BILIRUB SERPL-MCNC: 0.1 MG/DL — LOW (ref 0.2–1.2)
BUN SERPL-MCNC: 8 MG/DL — SIGNIFICANT CHANGE UP (ref 7–23)
CALCIUM SERPL-MCNC: 9.5 MG/DL — SIGNIFICANT CHANGE UP (ref 8.4–10.5)
CHLORIDE SERPL-SCNC: 103 MMOL/L — SIGNIFICANT CHANGE UP (ref 96–108)
CO2 SERPL-SCNC: 22 MMOL/L — SIGNIFICANT CHANGE UP (ref 22–31)
CREAT SERPL-MCNC: 0.6 MG/DL — SIGNIFICANT CHANGE UP (ref 0.5–1.3)
EOSINOPHIL # BLD AUTO: 0.15 K/UL — SIGNIFICANT CHANGE UP (ref 0–0.5)
EOSINOPHIL NFR BLD AUTO: 1.2 % — SIGNIFICANT CHANGE UP (ref 0–6)
GLUCOSE SERPL-MCNC: 90 MG/DL — SIGNIFICANT CHANGE UP (ref 70–99)
HCG SERPL-ACNC: HIGH MIU/ML
HCT VFR BLD CALC: 34.4 % — LOW (ref 34.5–45)
HGB BLD-MCNC: 11.5 G/DL — SIGNIFICANT CHANGE UP (ref 11.5–15.5)
IMM GRANULOCYTES NFR BLD AUTO: 1.2 % — SIGNIFICANT CHANGE UP (ref 0–1.5)
LYMPHOCYTES # BLD AUTO: 18.1 % — SIGNIFICANT CHANGE UP (ref 13–44)
LYMPHOCYTES # BLD AUTO: 2.33 K/UL — SIGNIFICANT CHANGE UP (ref 1–3.3)
MCHC RBC-ENTMCNC: 26.4 PG — LOW (ref 27–34)
MCHC RBC-ENTMCNC: 33.4 GM/DL — SIGNIFICANT CHANGE UP (ref 32–36)
MCV RBC AUTO: 78.9 FL — LOW (ref 80–100)
MONOCYTES # BLD AUTO: 0.99 K/UL — HIGH (ref 0–0.9)
MONOCYTES NFR BLD AUTO: 7.7 % — SIGNIFICANT CHANGE UP (ref 2–14)
NEUTROPHILS # BLD AUTO: 9.21 K/UL — HIGH (ref 1.8–7.4)
NEUTROPHILS NFR BLD AUTO: 71.5 % — SIGNIFICANT CHANGE UP (ref 43–77)
NRBC # BLD: 0 /100 WBCS — SIGNIFICANT CHANGE UP (ref 0–0)
PLATELET # BLD AUTO: 261 K/UL — SIGNIFICANT CHANGE UP (ref 150–400)
POTASSIUM SERPL-MCNC: 4 MMOL/L — SIGNIFICANT CHANGE UP (ref 3.5–5.3)
POTASSIUM SERPL-SCNC: 4 MMOL/L — SIGNIFICANT CHANGE UP (ref 3.5–5.3)
PROT SERPL-MCNC: 6.9 G/DL — SIGNIFICANT CHANGE UP (ref 6–8.3)
RBC # BLD: 4.36 M/UL — SIGNIFICANT CHANGE UP (ref 3.8–5.2)
RBC # FLD: 13.2 % — SIGNIFICANT CHANGE UP (ref 10.3–14.5)
SODIUM SERPL-SCNC: 137 MMOL/L — SIGNIFICANT CHANGE UP (ref 135–145)
WBC # BLD: 12.87 K/UL — HIGH (ref 3.8–10.5)
WBC # FLD AUTO: 12.87 K/UL — HIGH (ref 3.8–10.5)

## 2020-10-20 PROCEDURE — 84702 CHORIONIC GONADOTROPIN TEST: CPT

## 2020-10-20 PROCEDURE — 93005 ELECTROCARDIOGRAM TRACING: CPT

## 2020-10-20 PROCEDURE — 99284 EMERGENCY DEPT VISIT MOD MDM: CPT

## 2020-10-20 PROCEDURE — 93010 ELECTROCARDIOGRAM REPORT: CPT | Mod: 59

## 2020-10-20 PROCEDURE — 85025 COMPLETE CBC W/AUTO DIFF WBC: CPT

## 2020-10-20 PROCEDURE — 80053 COMPREHEN METABOLIC PANEL: CPT

## 2020-10-20 PROCEDURE — 99284 EMERGENCY DEPT VISIT MOD MDM: CPT | Mod: 25

## 2020-10-20 RX ORDER — SODIUM CHLORIDE 9 MG/ML
1000 INJECTION INTRAMUSCULAR; INTRAVENOUS; SUBCUTANEOUS ONCE
Refills: 0 | Status: COMPLETED | OUTPATIENT
Start: 2020-10-20 | End: 2020-10-20

## 2020-10-20 RX ADMIN — SODIUM CHLORIDE 1000 MILLILITER(S): 9 INJECTION INTRAMUSCULAR; INTRAVENOUS; SUBCUTANEOUS at 13:58

## 2020-10-20 NOTE — ED ADULT NURSE REASSESSMENT NOTE - NS ED NURSE REASSESS COMMENT FT1
Patient reports to relief in symptoms of feeling dizzy, lightheaded, dizzy. Patient reports to relief in symptoms of feeling dizzy, lightheaded, dizzy. To be discharged. VSS.

## 2020-10-20 NOTE — ED PROVIDER NOTE - NSFOLLOWUPCLINICS_GEN_ALL_ED_FT
[facility] General Internal Medicine  General Internal Medicine  [address] [id number]  Phone: [phone]  Fax:   Follow Up Time: 4-6 Days

## 2020-10-20 NOTE — ED PROVIDER NOTE - NSFOLLOWUPINSTRUCTIONS_ED_ALL_ED_FT
1) Follow-up with your primary care provider in 1-2 days.      Follow-up with your OBGYN as scheduled.      Discuss your symptoms with your OBGYN and primary care provider for further outpatient work-up if needed.      Discuss your anxiety symptoms with your OBGYN and treatment options available to you.    2) Continue to take all medications as prescribed.    3) Rest and drink plenty of fluids.    4) Return to the ER for any new or worsening symptoms.

## 2020-10-20 NOTE — ED ADULT NURSE NOTE - OBJECTIVE STATEMENT
30 y female 17 weeks pregnant  presents from home with palpitations, lightheadedness, dizziness, nausea. Reports to hx of anxiety being on Xanax PRN before pregnancy. Denies chest pain, SOB, fevers, chills, cough, pain/ difficulty urinating. A&Ox3. Skin warm dry and intact. Ambulating without difficulty- reports to performing ADLs without difficulty. Breathing comfortably in bed- no distress. Abdomen soft nontender nondistended. Safety maintained- bed locked in lowest position.

## 2020-10-20 NOTE — ED PROVIDER NOTE - OBJECTIVE STATEMENT
29 y/o F PMHx of anxiety previously on PRN Xanax, has not taken in over 1 year,  [facility]17 wks [place] [facility] US @12wks [place]) presents c/o palpitations and lightheadedness since the start of this pregnancy, acutely worsening since yesterday. Pt notes yesterday began having lightheadedness and felt as though her heart were beating out of her chest, denies sensation of irregular rhythm. Sxs occur independent of activity. Pt states she would like to change her PCP care so has not discussed sxs with her PCP. Pt notes last night sensation that "everything" was spinning. Pt denies HA, visual change, abd pain, n/v/d, cramping, LOF, VB, f/c, recent illness or URI sxs, tinnitus, hearing loss, h/o vertigo, hemoptysis, recent leg pain/swelling, personal or FHx of DVT/PE, prolonged immobility, hormone use. Drug use, current etoh use or frequent/heavy etoh use prior to pregnancy.  DISCREPANCY FROM ED TRIAGE NOTE: Pt denies chest pain and discomfort, only notes palpitations.

## 2020-10-20 NOTE — ED PROVIDER NOTE - PHYSICAL EXAMINATION
GEN: Pt in NAD, A&O x3.  PSYCH: Anxious.  EYES: Sclera white w/o injection. PERRL, EOMI.  ENT: Head NCAT. MMM. EACs clear, TMs pearly grey, clear serous effusion behind R TM, no vesicles. Auditory acuity intact and equal b/l to finger rub. Negative Mikel-Hallpike b/l. Neck supple FROM.  RESP: CTA b/l, no wheezes, rales, or rhonchi.   CARDIAC: RRR, clear distinct S1, S2, no appreciable murmurs.  ABD: Abdomen soft with gravid uterus appropriate for dates, non-tender. No CVAT b/l.  VASC: 2+ radial and dorsalis pedis pulses b/l. No edema or calf tenderness.  SKIN: No rashes on the trunk.

## 2020-10-20 NOTE — ED PROVIDER NOTE - CLINICAL SUMMARY MEDICAL DECISION MAKING FREE TEXT BOX
Dizziness and lightheadedness 17wks GA, denies actual chest pain. NSR on bedside monitor, no evidence of vertigo. Likely due to dehydration, exacerbated by anxiety. Evaluate for metabolic cause, very low suspicion for urgent/emergent cardiac etiology of palpitations, no suspicion for central cause of lightheadedness. Pt PERCs out. Plan for labs including electrolytes, EKG, IV hydration and reassessment. Dispo pending w/u. -Jethro Zapata PA-C

## 2020-10-20 NOTE — ED ADULT NURSE NOTE - ED STAT RN HANDOFF DETAILS
hand off received from area RN Graham Marroquin for break coverage. Awaiting urine sample from pt. IV fluid bolus started. A&Ox4

## 2020-10-20 NOTE — ED PROVIDER NOTE - PROGRESS NOTE DETAILS
.  Pt is feeling much better after fluids.  Dizziness is gone.  Pt will be discharged with OB follow up.  - Jeanette Murrell DO

## 2020-10-20 NOTE — ED PROVIDER NOTE - PATIENT PORTAL LINK FT
You can access the FollowMyHealth Patient Portal offered by [facility] by registering at the following website: [email or web address]. By joining [facility]’s FollowMyHealth portal, you will also be able to view your health information using other applications (apps) compatible with our system.

## 2020-11-13 ENCOUNTER — ASOB RESULT (OUTPATIENT)
Age: 30
End: 2020-11-13

## 2020-11-13 ENCOUNTER — APPOINTMENT (OUTPATIENT)
Dept: ANTEPARTUM | Facility: CLINIC | Age: 30
End: 2020-11-13
Payer: COMMERCIAL

## 2020-11-13 PROCEDURE — 99072 ADDL SUPL MATRL&STAF TM PHE: CPT

## 2020-11-13 PROCEDURE — 76811 OB US DETAILED SNGL FETUS: CPT

## 2021-01-05 ENCOUNTER — ASOB RESULT (OUTPATIENT)
Age: 31
End: 2021-01-05

## 2021-01-05 ENCOUNTER — APPOINTMENT (OUTPATIENT)
Dept: ANTEPARTUM | Facility: CLINIC | Age: 31
End: 2021-01-05
Payer: COMMERCIAL

## 2021-01-05 PROCEDURE — 99072 ADDL SUPL MATRL&STAF TM PHE: CPT

## 2021-01-05 PROCEDURE — 76816 OB US FOLLOW-UP PER FETUS: CPT

## 2021-01-05 PROCEDURE — 76819 FETAL BIOPHYS PROFIL W/O NST: CPT

## 2021-01-13 ENCOUNTER — ASOB RESULT (OUTPATIENT)
Age: 31
End: 2021-01-13

## 2021-01-13 ENCOUNTER — APPOINTMENT (OUTPATIENT)
Dept: MATERNAL FETAL MEDICINE | Facility: CLINIC | Age: 31
End: 2021-01-13
Payer: COMMERCIAL

## 2021-01-13 DIAGNOSIS — O99.810 ABNORMAL GLUCOSE COMPLICATING PREGNANCY: ICD-10-CM

## 2021-01-13 PROCEDURE — G0109 DIAB MANAGE TRN IND/GROUP: CPT | Mod: 95

## 2021-01-13 RX ORDER — BLOOD-GLUCOSE METER
W/DEVICE KIT MISCELLANEOUS
Qty: 1 | Refills: 0 | Status: ACTIVE | COMMUNITY
Start: 2021-01-13 | End: 1900-01-01

## 2021-01-13 RX ORDER — URINE ACETONE TEST STRIPS
STRIP MISCELLANEOUS
Qty: 1 | Refills: 1 | Status: ACTIVE | COMMUNITY
Start: 2021-01-13 | End: 1900-01-01

## 2021-01-25 DIAGNOSIS — O24.419 GESTATIONAL DIABETES MELLITUS IN PREGNANCY, UNSPECIFIED CONTROL: ICD-10-CM

## 2021-01-25 RX ORDER — BLOOD SUGAR DIAGNOSTIC
STRIP MISCELLANEOUS
Qty: 4 | Refills: 0 | Status: ACTIVE | COMMUNITY
Start: 2021-01-25 | End: 1900-01-01

## 2021-01-26 ENCOUNTER — APPOINTMENT (OUTPATIENT)
Dept: MATERNAL FETAL MEDICINE | Facility: CLINIC | Age: 31
End: 2021-01-26
Payer: COMMERCIAL

## 2021-01-26 ENCOUNTER — ASOB RESULT (OUTPATIENT)
Age: 31
End: 2021-01-26

## 2021-01-26 PROCEDURE — G0108 DIAB MANAGE TRN  PER INDIV: CPT | Mod: 95

## 2021-01-26 RX ORDER — INSULIN HUMAN 100 [IU]/ML
100 INJECTION, SUSPENSION SUBCUTANEOUS
Qty: 1 | Refills: 1 | Status: ACTIVE | COMMUNITY
Start: 2021-01-26 | End: 1900-01-01

## 2021-01-26 RX ORDER — ISOPROPYL ALCOHOL 70 ML/100ML
SWAB TOPICAL
Qty: 1 | Refills: 1 | Status: ACTIVE | COMMUNITY
Start: 2021-01-26 | End: 1900-01-01

## 2021-01-28 ENCOUNTER — NON-APPOINTMENT (OUTPATIENT)
Age: 31
End: 2021-01-28

## 2021-01-28 RX ORDER — LANCETS 33 GAUGE
EACH MISCELLANEOUS
Qty: 4 | Refills: 2 | Status: ACTIVE | COMMUNITY
Start: 2021-01-25 | End: 1900-01-01

## 2021-02-05 ENCOUNTER — ASOB RESULT (OUTPATIENT)
Age: 31
End: 2021-02-05

## 2021-02-05 ENCOUNTER — APPOINTMENT (OUTPATIENT)
Dept: MATERNAL FETAL MEDICINE | Facility: CLINIC | Age: 31
End: 2021-02-05
Payer: COMMERCIAL

## 2021-02-05 PROCEDURE — G0108 DIAB MANAGE TRN  PER INDIV: CPT | Mod: 95

## 2021-02-06 RX ORDER — INSULIN LISPRO 100 [IU]/ML
100 INJECTION, SOLUTION INTRAVENOUS; SUBCUTANEOUS
Qty: 2 | Refills: 0 | Status: ACTIVE | COMMUNITY
Start: 2021-02-05 | End: 1900-01-01

## 2021-02-08 ENCOUNTER — NON-APPOINTMENT (OUTPATIENT)
Age: 31
End: 2021-02-08

## 2021-02-16 ENCOUNTER — NON-APPOINTMENT (OUTPATIENT)
Age: 31
End: 2021-02-16

## 2021-02-17 ENCOUNTER — APPOINTMENT (OUTPATIENT)
Dept: ANTEPARTUM | Facility: CLINIC | Age: 31
End: 2021-02-17
Payer: COMMERCIAL

## 2021-02-17 ENCOUNTER — ASOB RESULT (OUTPATIENT)
Age: 31
End: 2021-02-17

## 2021-02-17 PROCEDURE — 99072 ADDL SUPL MATRL&STAF TM PHE: CPT

## 2021-02-17 PROCEDURE — 76816 OB US FOLLOW-UP PER FETUS: CPT

## 2021-02-18 ENCOUNTER — NON-APPOINTMENT (OUTPATIENT)
Age: 31
End: 2021-02-18

## 2021-02-22 ENCOUNTER — APPOINTMENT (OUTPATIENT)
Dept: MATERNAL FETAL MEDICINE | Facility: CLINIC | Age: 31
End: 2021-02-22
Payer: COMMERCIAL

## 2021-02-22 ENCOUNTER — ASOB RESULT (OUTPATIENT)
Age: 31
End: 2021-02-22

## 2021-02-22 PROCEDURE — G0108 DIAB MANAGE TRN  PER INDIV: CPT | Mod: 95

## 2021-03-02 RX ORDER — PEN NEEDLE, DIABETIC 29 G X1/2"
32G X 4 MM NEEDLE, DISPOSABLE MISCELLANEOUS
Qty: 1 | Refills: 1 | Status: ACTIVE | COMMUNITY
Start: 2021-01-26 | End: 1900-01-01

## 2021-03-02 RX ORDER — BLOOD-GLUCOSE METER
KIT MISCELLANEOUS 4 TIMES DAILY
Qty: 1 | Refills: 1 | Status: ACTIVE | COMMUNITY
Start: 2021-01-13 | End: 1900-01-01

## 2021-03-02 RX ORDER — LANCETS 33 GAUGE
EACH MISCELLANEOUS
Qty: 1 | Refills: 1 | Status: ACTIVE | COMMUNITY
Start: 2021-01-13 | End: 1900-01-01

## 2021-03-08 ENCOUNTER — APPOINTMENT (OUTPATIENT)
Dept: ANTEPARTUM | Facility: CLINIC | Age: 31
End: 2021-03-08
Payer: COMMERCIAL

## 2021-03-08 ENCOUNTER — NON-APPOINTMENT (OUTPATIENT)
Age: 31
End: 2021-03-08

## 2021-03-08 ENCOUNTER — ASOB RESULT (OUTPATIENT)
Age: 31
End: 2021-03-08

## 2021-03-08 PROCEDURE — 76819 FETAL BIOPHYS PROFIL W/O NST: CPT

## 2021-03-08 PROCEDURE — 99072 ADDL SUPL MATRL&STAF TM PHE: CPT

## 2021-03-12 ENCOUNTER — APPOINTMENT (OUTPATIENT)
Dept: MATERNAL FETAL MEDICINE | Facility: CLINIC | Age: 31
End: 2021-03-12
Payer: COMMERCIAL

## 2021-03-12 ENCOUNTER — ASOB RESULT (OUTPATIENT)
Age: 31
End: 2021-03-12

## 2021-03-12 PROCEDURE — G0108 DIAB MANAGE TRN  PER INDIV: CPT | Mod: 95

## 2021-03-15 ENCOUNTER — APPOINTMENT (OUTPATIENT)
Dept: ANTEPARTUM | Facility: CLINIC | Age: 31
End: 2021-03-15
Payer: COMMERCIAL

## 2021-03-15 ENCOUNTER — ASOB RESULT (OUTPATIENT)
Age: 31
End: 2021-03-15

## 2021-03-15 PROCEDURE — 76816 OB US FOLLOW-UP PER FETUS: CPT

## 2021-03-15 PROCEDURE — 76819 FETAL BIOPHYS PROFIL W/O NST: CPT

## 2021-03-15 PROCEDURE — 99072 ADDL SUPL MATRL&STAF TM PHE: CPT

## 2021-03-21 ENCOUNTER — INPATIENT (INPATIENT)
Facility: HOSPITAL | Age: 31
LOS: 2 days | Discharge: HOME | End: 2021-03-24
Attending: OBSTETRICS & GYNECOLOGY | Admitting: OBSTETRICS & GYNECOLOGY
Payer: COMMERCIAL

## 2021-03-21 VITALS
TEMPERATURE: 98 F | OXYGEN SATURATION: 98 % | DIASTOLIC BLOOD PRESSURE: 88 MMHG | HEART RATE: 88 BPM | SYSTOLIC BLOOD PRESSURE: 131 MMHG | RESPIRATION RATE: 18 BRPM

## 2021-03-21 DIAGNOSIS — O26.899 OTHER SPECIFIED PREGNANCY RELATED CONDITIONS, UNSPECIFIED TRIMESTER: ICD-10-CM

## 2021-03-21 DIAGNOSIS — Z3A.00 WEEKS OF GESTATION OF PREGNANCY NOT SPECIFIED: ICD-10-CM

## 2021-03-21 DIAGNOSIS — O24.419 GESTATIONAL DIABETES MELLITUS IN PREGNANCY, UNSPECIFIED CONTROL: ICD-10-CM

## 2021-03-21 DIAGNOSIS — Z34.80 ENCOUNTER FOR SUPERVISION OF OTHER NORMAL PREGNANCY, UNSPECIFIED TRIMESTER: ICD-10-CM

## 2021-03-21 DIAGNOSIS — Z98.890 OTHER SPECIFIED POSTPROCEDURAL STATES: Chronic | ICD-10-CM

## 2021-03-21 LAB
ALBUMIN SERPL ELPH-MCNC: 3.3 G/DL — SIGNIFICANT CHANGE UP (ref 3.3–5)
ALP SERPL-CCNC: 92 U/L — SIGNIFICANT CHANGE UP (ref 40–120)
ALT FLD-CCNC: 14 U/L — SIGNIFICANT CHANGE UP (ref 10–45)
ANION GAP SERPL CALC-SCNC: 14 MMOL/L — SIGNIFICANT CHANGE UP (ref 5–17)
APTT BLD: 27.5 SEC — SIGNIFICANT CHANGE UP (ref 27.5–35.5)
AST SERPL-CCNC: 24 U/L — SIGNIFICANT CHANGE UP (ref 10–40)
B-OH-BUTYR SERPL-SCNC: 0.9 MMOL/L — HIGH
BASOPHILS # BLD AUTO: 0.05 K/UL — SIGNIFICANT CHANGE UP (ref 0–0.2)
BASOPHILS NFR BLD AUTO: 0.4 % — SIGNIFICANT CHANGE UP (ref 0–2)
BILIRUB SERPL-MCNC: 0.2 MG/DL — SIGNIFICANT CHANGE UP (ref 0.2–1.2)
BLD GP AB SCN SERPL QL: NEGATIVE — SIGNIFICANT CHANGE UP
BUN SERPL-MCNC: 10 MG/DL — SIGNIFICANT CHANGE UP (ref 7–23)
CALCIUM SERPL-MCNC: 9.2 MG/DL — SIGNIFICANT CHANGE UP (ref 8.4–10.5)
CHLORIDE SERPL-SCNC: 105 MMOL/L — SIGNIFICANT CHANGE UP (ref 96–108)
CO2 SERPL-SCNC: 17 MMOL/L — LOW (ref 22–31)
COVID-19 SPIKE DOMAIN AB INTERP: NEGATIVE — SIGNIFICANT CHANGE UP
COVID-19 SPIKE DOMAIN ANTIBODY RESULT: 0.4 U/ML — SIGNIFICANT CHANGE UP
CREAT SERPL-MCNC: 0.55 MG/DL — SIGNIFICANT CHANGE UP (ref 0.5–1.3)
EOSINOPHIL # BLD AUTO: 0.13 K/UL — SIGNIFICANT CHANGE UP (ref 0–0.5)
EOSINOPHIL NFR BLD AUTO: 1 % — SIGNIFICANT CHANGE UP (ref 0–6)
FIBRINOGEN PPP-MCNC: 836 MG/DL — HIGH (ref 290–520)
GLUCOSE BLDC GLUCOMTR-MCNC: 82 MG/DL — SIGNIFICANT CHANGE UP (ref 70–99)
GLUCOSE BLDC GLUCOMTR-MCNC: 87 MG/DL — SIGNIFICANT CHANGE UP (ref 70–99)
GLUCOSE BLDC GLUCOMTR-MCNC: 89 MG/DL — SIGNIFICANT CHANGE UP (ref 70–99)
GLUCOSE SERPL-MCNC: 73 MG/DL — SIGNIFICANT CHANGE UP (ref 70–99)
HCT VFR BLD CALC: 34.7 % — SIGNIFICANT CHANGE UP (ref 34.5–45)
HGB BLD-MCNC: 11.3 G/DL — LOW (ref 11.5–15.5)
IMM GRANULOCYTES NFR BLD AUTO: 1.2 % — SIGNIFICANT CHANGE UP (ref 0–1.5)
INR BLD: 1.11 RATIO — SIGNIFICANT CHANGE UP (ref 0.88–1.16)
LDH SERPL L TO P-CCNC: 219 U/L — SIGNIFICANT CHANGE UP (ref 50–242)
LYMPHOCYTES # BLD AUTO: 19.1 % — SIGNIFICANT CHANGE UP (ref 13–44)
LYMPHOCYTES # BLD AUTO: 2.4 K/UL — SIGNIFICANT CHANGE UP (ref 1–3.3)
MCHC RBC-ENTMCNC: 25.2 PG — LOW (ref 27–34)
MCHC RBC-ENTMCNC: 32.6 GM/DL — SIGNIFICANT CHANGE UP (ref 32–36)
MCV RBC AUTO: 77.3 FL — LOW (ref 80–100)
MONOCYTES # BLD AUTO: 0.84 K/UL — SIGNIFICANT CHANGE UP (ref 0–0.9)
MONOCYTES NFR BLD AUTO: 6.7 % — SIGNIFICANT CHANGE UP (ref 2–14)
NEUTROPHILS # BLD AUTO: 8.99 K/UL — HIGH (ref 1.8–7.4)
NEUTROPHILS NFR BLD AUTO: 71.6 % — SIGNIFICANT CHANGE UP (ref 43–77)
NRBC # BLD: 0 /100 WBCS — SIGNIFICANT CHANGE UP (ref 0–0)
PLATELET # BLD AUTO: 231 K/UL — SIGNIFICANT CHANGE UP (ref 150–400)
POTASSIUM SERPL-MCNC: 3.8 MMOL/L — SIGNIFICANT CHANGE UP (ref 3.5–5.3)
POTASSIUM SERPL-SCNC: 3.8 MMOL/L — SIGNIFICANT CHANGE UP (ref 3.5–5.3)
PROT SERPL-MCNC: 6.6 G/DL — SIGNIFICANT CHANGE UP (ref 6–8.3)
PROTHROM AB SERPL-ACNC: 13.3 SEC — SIGNIFICANT CHANGE UP (ref 10.6–13.6)
RBC # BLD: 4.49 M/UL — SIGNIFICANT CHANGE UP (ref 3.8–5.2)
RBC # FLD: 15.7 % — HIGH (ref 10.3–14.5)
RH IG SCN BLD-IMP: NEGATIVE — SIGNIFICANT CHANGE UP
SARS-COV-2 IGG+IGM SERPL QL IA: 0.4 U/ML — SIGNIFICANT CHANGE UP
SARS-COV-2 IGG+IGM SERPL QL IA: NEGATIVE — SIGNIFICANT CHANGE UP
SARS-COV-2 RNA SPEC QL NAA+PROBE: SIGNIFICANT CHANGE UP
SODIUM SERPL-SCNC: 136 MMOL/L — SIGNIFICANT CHANGE UP (ref 135–145)
URATE SERPL-MCNC: 5.1 MG/DL — SIGNIFICANT CHANGE UP (ref 2.5–7)
WBC # BLD: 12.56 K/UL — HIGH (ref 3.8–10.5)
WBC # FLD AUTO: 12.56 K/UL — HIGH (ref 3.8–10.5)

## 2021-03-21 RX ORDER — SODIUM CHLORIDE 9 MG/ML
1000 INJECTION, SOLUTION INTRAVENOUS
Refills: 0 | Status: DISCONTINUED | OUTPATIENT
Start: 2021-03-21 | End: 2021-03-22

## 2021-03-21 RX ORDER — OXYTOCIN 10 UNIT/ML
333.33 VIAL (ML) INJECTION
Qty: 20 | Refills: 0 | Status: DISCONTINUED | OUTPATIENT
Start: 2021-03-21 | End: 2021-03-24

## 2021-03-21 RX ORDER — SERTRALINE 25 MG/1
25 TABLET, FILM COATED ORAL DAILY
Refills: 0 | Status: DISCONTINUED | OUTPATIENT
Start: 2021-03-21 | End: 2021-03-24

## 2021-03-21 RX ORDER — CITRIC ACID/SODIUM CITRATE 300-500 MG
15 SOLUTION, ORAL ORAL EVERY 6 HOURS
Refills: 0 | Status: DISCONTINUED | OUTPATIENT
Start: 2021-03-21 | End: 2021-03-22

## 2021-03-21 RX ORDER — SODIUM CHLORIDE 9 MG/ML
1000 INJECTION INTRAMUSCULAR; INTRAVENOUS; SUBCUTANEOUS
Refills: 0 | Status: DISCONTINUED | OUTPATIENT
Start: 2021-03-21 | End: 2021-03-22

## 2021-03-21 RX ADMIN — SODIUM CHLORIDE 125 MILLILITER(S): 9 INJECTION, SOLUTION INTRAVENOUS at 17:01

## 2021-03-21 RX ADMIN — SERTRALINE 25 MILLIGRAM(S): 25 TABLET, FILM COATED ORAL at 22:19

## 2021-03-21 RX ADMIN — Medication 15 MILLILITER(S): at 17:49

## 2021-03-21 NOTE — OB PROVIDER H&P - PROBLEM SELECTOR PLAN 1
- COVID swabbed  - continuous monitoring  - induce with PO cytotec  - routine labs  - beta hydroxy butyrate  - GDM management  - FHT: baseline indeterminate  - no ctx on toco  - BPP 8/8, ODIN 8    d/w Dr. Ranjan Marie PGY1

## 2021-03-21 NOTE — OB PROVIDER H&P - NSHPPHYSICALEXAM_GEN_ALL_CORE
Vital Signs Last 24 Hrs  T(C): 36.9 (21 Mar 2021 16:20), Max: 36.9 (21 Mar 2021 16:20)  T(F): 98.42 (21 Mar 2021 16:20), Max: 98.42 (21 Mar 2021 16:20)  HR: 83 (21 Mar 2021 17:23) (77 - 90)  BP: 122/84 (21 Mar 2021 17:13) (104/65 - 131/88)  BP(mean): --  RR: 18 (21 Mar 2021 15:52) (18 - 18)  SpO2: 99% (21 Mar 2021 17:23) (97% - 100%)    Gen NAD  CV RRR  Pulm CTABL  Abd soft nontender  Ext: trace edema

## 2021-03-21 NOTE — OB PROVIDER TRIAGE NOTE - NS_OBGYNHISTORY_OBGYN_ALL_OB_FT
x3   MAB x2 (1 d&c)     8#5, 2016  7#11, 2017  7#7 c/b gHTN. D+C 2020, SAB 2020  GDMA2  anxiety/depression on zoloft 25 mg

## 2021-03-21 NOTE — PRE-ANESTHESIA EVALUATION ADULT - NSANTHPMHFT_GEN_ALL_CORE
38 weeks 3 days gestation; gestational diabetes on insulin poorly controlled and noncompliant with insulin since friday; D+C, vocal cord polyp at age 1;

## 2021-03-21 NOTE — OB PROVIDER H&P - ATTENDING COMMENTS
Dapsone Counseling: I discussed with the patient the risks of dapsone including but not limited to hemolytic anemia, agranulocytosis, rashes, methemoglobinemia, kidney failure, peripheral neuropathy, headaches, GI upset, and liver toxicity.  Patients who start dapsone require monitoring including baseline LFTs and weekly CBCs for the first month, then every month thereafter.  The patient verbalized understanding of the proper use and possible adverse effects of dapsone.  All of the patient's questions and concerns were addressed. Doxycycline Counseling:  Patient counseled regarding possible photosensitivity and increased risk for sunburn.  Patient instructed to avoid sunlight, if possible.  When exposed to sunlight, patients should wear protective clothing, sunglasses, and sunscreen.  The patient was instructed to call the office immediately if the following severe adverse effects occur:  hearing changes, easy bruising/bleeding, severe headache, or vision changes.  The patient verbalized understanding of the proper use and possible adverse effects of doxycycline.  All of the patient's questions and concerns were addressed. Topical Retinoid counseling:  Patient advised to apply a pea-sized amount only at bedtime and wait 30 minutes after washing their face before applying.  If too drying, patient may add a non-comedogenic moisturizer. The patient verbalized understanding of the proper use and possible adverse effects of retinoids.  All of the patient's questions and concerns were addressed. Birth Control Pills Pregnancy And Lactation Text: This medication should be avoided if pregnant and for the first 30 days post-partum. Topical Sulfur Applications Counseling: Topical Sulfur Counseling: Patient counseled that this medication may cause skin irritation or allergic reactions.  In the event of skin irritation, the patient was advised to reduce the amount of the drug applied or use it less frequently.   The patient verbalized understanding of the proper use and possible adverse effects of topical sulfur application.  All of the patient's questions and concerns were addressed. Tetracycline Pregnancy And Lactation Text: This medication is Pregnancy Category D and not consider safe during pregnancy. It is also excreted in breast milk. Spironolactone Counseling: Patient advised regarding risks of diarrhea, abdominal pain, hyperkalemia, birth defects (for female patients), liver toxicity and renal toxicity. The patient may need blood work to monitor liver and kidney function and potassium levels while on therapy. The patient verbalized understanding of the proper use and possible adverse effects of spironolactone.  All of the patient's questions and concerns were addressed. Tetracycline Counseling: Patient counseled regarding possible photosensitivity and increased risk for sunburn.  Patient instructed to avoid sunlight, if possible.  When exposed to sunlight, patients should wear protective clothing, sunglasses, and sunscreen.  The patient was instructed to call the office immediately if the following severe adverse effects occur:  hearing changes, easy bruising/bleeding, severe headache, or vision changes.  The patient verbalized understanding of the proper use and possible adverse effects of tetracycline.  All of the patient's questions and concerns were addressed. Patient understands to avoid pregnancy while on therapy due to potential birth defects. Isotretinoin Pregnancy And Lactation Text: This medication is Pregnancy Category X and is considered extremely dangerous during pregnancy. It is unknown if it is excreted in breast milk. Bactrim Counseling:  I discussed with the patient the risks of sulfa antibiotics including but not limited to GI upset, allergic reaction, drug rash, diarrhea, dizziness, photosensitivity, and yeast infections.  Rarely, more serious reactions can occur including but not limited to aplastic anemia, agranulocytosis, methemoglobinemia, blood dyscrasias, liver or kidney failure, lung infiltrates or desquamative/blistering drug rashes. Doxycycline Pregnancy And Lactation Text: This medication is Pregnancy Category D and not consider safe during pregnancy. It is also excreted in breast milk but is considered safe for shorter treatment courses. Minocycline Counseling: Patient advised regarding possible photosensitivity and discoloration of the teeth, skin, lips, tongue and gums.  Patient instructed to avoid sunlight, if possible.  When exposed to sunlight, patients should wear protective clothing, sunglasses, and sunscreen.  The patient was instructed to call the office immediately if the following severe adverse effects occur:  hearing changes, easy bruising/bleeding, severe headache, or vision changes.  The patient verbalized understanding of the proper use and possible adverse effects of minocycline.  All of the patient's questions and concerns were addressed. Azithromycin Pregnancy And Lactation Text: This medication is considered safe during pregnancy and is also secreted in breast milk. High Dose Vitamin A Pregnancy And Lactation Text: High dose vitamin A therapy is contraindicated during pregnancy and breast feeding. Birth Control Pills Counseling: Birth Control Pill Counseling: I discussed with the patient the potential side effects of OCPs including but not limited to increased risk of stroke, heart attack, thrombophlebitis, deep venous thrombosis, hepatic adenomas, breast changes, GI upset, headaches, and depression.  The patient verbalized understanding of the proper use and possible adverse effects of OCPs. All of the patient's questions and concerns were addressed. Topical Clindamycin Pregnancy And Lactation Text: This medication is Pregnancy Category B and is considered safe during pregnancy. It is unknown if it is excreted in breast milk. Dapsone Pregnancy And Lactation Text: This medication is Pregnancy Category C and is not considered safe during pregnancy or breast feeding. Erythromycin Pregnancy And Lactation Text: This medication is Pregnancy Category B and is considered safe during pregnancy. It is also excreted in breast milk. Tazorac Counseling:  Patient advised that medication is irritating and drying.  Patient may need to apply sparingly and wash off after an hour before eventually leaving it on overnight.  The patient verbalized understanding of the proper use and possible adverse effects of tazorac.  All of the patient's questions and concerns were addressed. Include Pregnancy/Lactation Warning?: No Topical Clindamycin Counseling: Patient counseled that this medication may cause skin irritation or allergic reactions.  In the event of skin irritation, the patient was advised to reduce the amount of the drug applied or use it less frequently.   The patient verbalized understanding of the proper use and possible adverse effects of clindamycin.  All of the patient's questions and concerns were addressed. Benzoyl Peroxide Counseling: Patient counseled that medicine may cause skin irritation and bleach clothing.  In the event of skin irritation, the patient was advised to reduce the amount of the drug applied or use it less frequently.   The patient verbalized understanding of the proper use and possible adverse effects of benzoyl peroxide.  All of the patient's questions and concerns were addressed. Erythromycin Counseling:  I discussed with the patient the risks of erythromycin including but not limited to GI upset, allergic reaction, drug rash, diarrhea, increase in liver enzymes, and yeast infections. Bactrim Pregnancy And Lactation Text: This medication is Pregnancy Category D and is known to cause fetal risk.  It is also excreted in breast milk. Topical Sulfur Applications Pregnancy And Lactation Text: This medication is Pregnancy Category C and has an unknown safety profile during pregnancy. It is unknown if this topical medication is excreted in breast milk. Spironolactone Pregnancy And Lactation Text: This medication can cause feminization of the male fetus and should be avoided during pregnancy. The active metabolite is also found in breast milk. Detail Level: Zone High Dose Vitamin A Counseling: Side effects reviewed, pt to contact office should one occur. Topical Retinoid Pregnancy And Lactation Text: This medication is Pregnancy Category C. It is unknown if this medication is excreted in breast milk. Benzoyl Peroxide Pregnancy And Lactation Text: This medication is Pregnancy Category C. It is unknown if benzoyl peroxide is excreted in breast milk. Tazorac Pregnancy And Lactation Text: This medication is not safe during pregnancy. It is unknown if this medication is excreted in breast milk. Azithromycin Counseling:  I discussed with the patient the risks of azithromycin including but not limited to GI upset, allergic reaction, drug rash, diarrhea, and yeast infections. Isotretinoin Counseling: Patient should get monthly blood tests, not donate blood, not drive at night if vision affected, not share medication, and not undergo elective surgery for 6 months after tx completed. Side effects reviewed, pt to contact office should one occur. Detail Level: Detailed Pt seen and examined.  Agree with note above  All testing normal, however, unexplained sudden onset of poorly controlled sugars, now with decreasing insulin requirements with symptomatic hypoglycemia.  Pt also with increasing anxiety this pregnancy and has only been on 1/2 of her recommended dose of zoloft because she is anxious taking a higher dose.  will admit for induction  explained to patient  all questions answered.

## 2021-03-21 NOTE — OB PROVIDER TRIAGE NOTE - NSOBPROVIDERNOTE_OBGYN_ALL_OB_FT
29yo  at 38w3d presenting for poorly controlled blood glucose at home.   - FS 82 in triage, pt afebrile  - CMP, CBC, T&S, beta hydroxy butyrate sent  - D5 NS fluids started  - continuous monitoring  - for BPP    d/w Dr. Ranjan Marie PGy1

## 2021-03-21 NOTE — OB PROVIDER TRIAGE NOTE - NSHPPHYSICALEXAM_GEN_ALL_CORE
Vital Signs Last 24 Hrs  T(C): 36.9 (21 Mar 2021 16:20), Max: 36.9 (21 Mar 2021 16:20)  T(F): 98.42 (21 Mar 2021 16:20), Max: 98.42 (21 Mar 2021 16:20)  HR: 89 (21 Mar 2021 16:38) (77 - 90)  BP: 104/65 (21 Mar 2021 16:30) (104/65 - 131/88)  BP(mean): --  RR: 18 (21 Mar 2021 15:52) (18 - 18)  SpO2: 97% (21 Mar 2021 16:38) (97% - 100%)    Gen NAD  CV RRR  Pulm CTABL  Abd soft nontender  Ext trace edema  SVE 0/0/-3  FHT reactive  Pateros no ctx

## 2021-03-21 NOTE — OB PROVIDER H&P - NSPPHNORISK_OBGYN_ALL_OB
chart reviewed. denies sig pmh, active
In my judgment no risk for PPH has been identified at this time.

## 2021-03-21 NOTE — OB PROVIDER H&P - HISTORY OF PRESENT ILLNESS
29yo  at 38w3d presenting for poorly controlled blood glucose at home. Pt states that she had a burger noble burger with [employer] fried on Friday night and ever since then her sugars were poorly controlled. She also stopped taking her insulin with meals after friday as well. Yesterday (Sat) it was 240 after breakfast, then 90 1hr later then 70 - when she was shaking uncontrollably and had to drink half a bottle of Gatorade. This morning her sugar was 68 and she was unable to get out of bed. Pt reports some nausea and one episode of diarrhea earlier in the week. Denies dizziness, fevers, headache, visual disturbances, CP, SOB, abdominal pain and edema.     Denies ctx, LOF, VB, dec FM. GBS negative, EFW 7# on Monday  PNC: c/b GDMA2 (48 Humalin qhs, Humalog 14 with meals)  POB:   8#5, 2016  7#11, 2017  7#7 c/b gHTN. D+C 2020, SAB 2020  GYN denies fibroids, cysts, ab paps, STIs  PMH denies  PSH: D+C, vocal cord polyp at age 1  All: NKDA, tree nuts  Meds: Zoloft 25, 48 Humalin qhs, Humalog 14 with meals, ASA, Fe, Vit C  Shx: denies toxic habits. hx of anxiety - hospitalized in October for panic attack and started on Zoloft in January after diagnosis of GDM. Pt was prescribed a dose of 50 of Zoloft but reports being "too anxious to take her anxiety meds" so only takes 25 daily.

## 2021-03-22 ENCOUNTER — TRANSCRIPTION ENCOUNTER (OUTPATIENT)
Age: 31
End: 2021-03-22

## 2021-03-22 ENCOUNTER — APPOINTMENT (OUTPATIENT)
Dept: ANTEPARTUM | Facility: CLINIC | Age: 31
End: 2021-03-22

## 2021-03-22 LAB
GLUCOSE BLDC GLUCOMTR-MCNC: 104 MG/DL — HIGH (ref 70–99)
GLUCOSE BLDC GLUCOMTR-MCNC: 86 MG/DL — SIGNIFICANT CHANGE UP (ref 70–99)
GLUCOSE BLDC GLUCOMTR-MCNC: 95 MG/DL — SIGNIFICANT CHANGE UP (ref 70–99)
T PALLIDUM AB TITR SER: NEGATIVE — SIGNIFICANT CHANGE UP

## 2021-03-22 RX ORDER — KETOROLAC TROMETHAMINE 30 MG/ML
30 SYRINGE (ML) INJECTION ONCE
Refills: 0 | Status: DISCONTINUED | OUTPATIENT
Start: 2021-03-22 | End: 2021-03-22

## 2021-03-22 RX ORDER — OXYCODONE HYDROCHLORIDE 5 MG/1
5 TABLET ORAL
Refills: 0 | Status: DISCONTINUED | OUTPATIENT
Start: 2021-03-22 | End: 2021-03-24

## 2021-03-22 RX ORDER — OXYTOCIN 10 UNIT/ML
333.33 VIAL (ML) INJECTION
Qty: 20 | Refills: 0 | Status: DISCONTINUED | OUTPATIENT
Start: 2021-03-22 | End: 2021-03-24

## 2021-03-22 RX ORDER — SERTRALINE 25 MG/1
50 TABLET, FILM COATED ORAL ONCE
Refills: 0 | Status: COMPLETED | OUTPATIENT
Start: 2021-03-22 | End: 2021-03-22

## 2021-03-22 RX ORDER — ACETAMINOPHEN 500 MG
2 TABLET ORAL
Qty: 0 | Refills: 0 | DISCHARGE

## 2021-03-22 RX ORDER — ACETAMINOPHEN 500 MG
3 TABLET ORAL
Qty: 0 | Refills: 0 | DISCHARGE
Start: 2021-03-22

## 2021-03-22 RX ORDER — AER TRAVELER 0.5 G/1
1 SOLUTION RECTAL; TOPICAL EVERY 4 HOURS
Refills: 0 | Status: DISCONTINUED | OUTPATIENT
Start: 2021-03-22 | End: 2021-03-24

## 2021-03-22 RX ORDER — ACETAMINOPHEN 500 MG
975 TABLET ORAL
Refills: 0 | Status: DISCONTINUED | OUTPATIENT
Start: 2021-03-22 | End: 2021-03-24

## 2021-03-22 RX ORDER — MAGNESIUM HYDROXIDE 400 MG/1
30 TABLET, CHEWABLE ORAL
Refills: 0 | Status: DISCONTINUED | OUTPATIENT
Start: 2021-03-22 | End: 2021-03-24

## 2021-03-22 RX ORDER — PRAMOXINE HYDROCHLORIDE 150 MG/15G
1 AEROSOL, FOAM RECTAL EVERY 4 HOURS
Refills: 0 | Status: DISCONTINUED | OUTPATIENT
Start: 2021-03-22 | End: 2021-03-24

## 2021-03-22 RX ORDER — IBUPROFEN 200 MG
600 TABLET ORAL EVERY 6 HOURS
Refills: 0 | Status: COMPLETED | OUTPATIENT
Start: 2021-03-22 | End: 2022-02-18

## 2021-03-22 RX ORDER — LANOLIN
1 OINTMENT (GRAM) TOPICAL EVERY 6 HOURS
Refills: 0 | Status: DISCONTINUED | OUTPATIENT
Start: 2021-03-22 | End: 2021-03-24

## 2021-03-22 RX ORDER — IBUPROFEN 200 MG
600 TABLET ORAL EVERY 6 HOURS
Refills: 0 | Status: DISCONTINUED | OUTPATIENT
Start: 2021-03-22 | End: 2021-03-24

## 2021-03-22 RX ORDER — OXYTOCIN 10 UNIT/ML
4 VIAL (ML) INJECTION
Qty: 30 | Refills: 0 | Status: DISCONTINUED | OUTPATIENT
Start: 2021-03-22 | End: 2021-03-24

## 2021-03-22 RX ORDER — TETANUS TOXOID, REDUCED DIPHTHERIA TOXOID AND ACELLULAR PERTUSSIS VACCINE, ADSORBED 5; 2.5; 8; 8; 2.5 [IU]/.5ML; [IU]/.5ML; UG/.5ML; UG/.5ML; UG/.5ML
0.5 SUSPENSION INTRAMUSCULAR ONCE
Refills: 0 | Status: DISCONTINUED | OUTPATIENT
Start: 2021-03-22 | End: 2021-03-24

## 2021-03-22 RX ORDER — DIBUCAINE 1 %
1 OINTMENT (GRAM) RECTAL EVERY 6 HOURS
Refills: 0 | Status: DISCONTINUED | OUTPATIENT
Start: 2021-03-22 | End: 2021-03-24

## 2021-03-22 RX ORDER — HYDROCORTISONE 1 %
1 OINTMENT (GRAM) TOPICAL EVERY 6 HOURS
Refills: 0 | Status: DISCONTINUED | OUTPATIENT
Start: 2021-03-22 | End: 2021-03-24

## 2021-03-22 RX ORDER — SIMETHICONE 80 MG/1
80 TABLET, CHEWABLE ORAL EVERY 4 HOURS
Refills: 0 | Status: DISCONTINUED | OUTPATIENT
Start: 2021-03-22 | End: 2021-03-24

## 2021-03-22 RX ORDER — DIPHENHYDRAMINE HCL 50 MG
25 CAPSULE ORAL EVERY 6 HOURS
Refills: 0 | Status: DISCONTINUED | OUTPATIENT
Start: 2021-03-22 | End: 2021-03-24

## 2021-03-22 RX ORDER — SERTRALINE 25 MG/1
1 TABLET, FILM COATED ORAL
Qty: 0 | Refills: 0 | DISCHARGE
Start: 2021-03-22

## 2021-03-22 RX ORDER — SODIUM CHLORIDE 9 MG/ML
3 INJECTION INTRAMUSCULAR; INTRAVENOUS; SUBCUTANEOUS EVERY 8 HOURS
Refills: 0 | Status: DISCONTINUED | OUTPATIENT
Start: 2021-03-22 | End: 2021-03-24

## 2021-03-22 RX ORDER — BENZOCAINE 10 %
1 GEL (GRAM) MUCOUS MEMBRANE EVERY 6 HOURS
Refills: 0 | Status: DISCONTINUED | OUTPATIENT
Start: 2021-03-22 | End: 2021-03-24

## 2021-03-22 RX ORDER — OXYCODONE HYDROCHLORIDE 5 MG/1
5 TABLET ORAL ONCE
Refills: 0 | Status: DISCONTINUED | OUTPATIENT
Start: 2021-03-22 | End: 2021-03-24

## 2021-03-22 RX ADMIN — Medication 975 MILLIGRAM(S): at 21:00

## 2021-03-22 RX ADMIN — Medication 30 MILLIGRAM(S): at 17:00

## 2021-03-22 RX ADMIN — SERTRALINE 50 MILLIGRAM(S): 25 TABLET, FILM COATED ORAL at 15:45

## 2021-03-22 RX ADMIN — Medication 4 MILLIUNIT(S)/MIN: at 12:48

## 2021-03-22 RX ADMIN — Medication 975 MILLIGRAM(S): at 20:15

## 2021-03-22 NOTE — OB RN DELIVERY SUMMARY - NS_NUCHALCORD_OBGYN_ALL_OB
S/w Ruben pharmacist at [facility] [phone]  Patient's recent medication fills are as follows:    1/18/19: Timolol 0.25% eye drops 1 drop both eyes daily  12/31/18: Aspirin 81 mg daily  12/31/18: Losartan 100 mg daily  12/31/18: Toprol XL 50 mg daily  12/31/18: Pravastatin 40 mg daily  12/1/18: Amlodipine 10mg daily    Also requested to price out eliquis and xarelto - both are ~$170 with patient's insurance - informed med PA
None

## 2021-03-22 NOTE — DISCHARGE NOTE OB - PATIENT PORTAL LINK FT
You can access the FollowMyHealth Patient Portal offered by [facility] by registering at the following website: [email or web address]. By joining "[employer]’s FollowMyHealth portal, you will also be able to view your health information using other applications (apps) compatible with our system.

## 2021-03-22 NOTE — DISCHARGE NOTE OB - CARE PROVIDER_API CALL
Marianna An (DO)  Obstetrics and Gynecology  [address], [address]  [place] [address]  Phone: [phone]  Fax: [phone]  Follow Up Time:

## 2021-03-22 NOTE — DISCHARGE NOTE OB - CARE PLAN
Principal Discharge DX:	Vaginal delivery  Goal:	Recovery  Assessment and plan of treatment:	Patient is s/p an uncomplicated vaginal delivery. Continue with routine post partum care including pelvic rest for 6 weeks (no sex, tampons, douching, etc).  Secondary Diagnosis:	Anxiety with depression  Goal:	Wellness  Assessment and plan of treatment:	c/w Zoloft daily.

## 2021-03-22 NOTE — OB RN DELIVERY SUMMARY - NS_SEPSISRSKCALC_OBGYN_ALL_OB_FT
EOS calculated successfully. EOS Risk Factor: 0.5/1000 live births ([facility] national incidence); GA=38w4d; Temp=98.6; ROM=0.483; GBS='Negative'; Antibiotics='No antibiotics or any antibiotics < 2 hrs prior to birth'

## 2021-03-22 NOTE — OB PROVIDER LABOR PROGRESS NOTE - ASSESSMENT
31yo  @ 38w 4 d admitted fro IOL secondary to poorly controlled gDMA2  - fetal status - cat 1  - GBS neg  - pain control as needed  - IOL - continue PO cytotec, cervical balloon placed  - Continue FS per protocol  Mirella Lord PA-C

## 2021-03-22 NOTE — DISCHARGE NOTE OB - HOSPITAL COURSE
Patient is s/p an uncomplicated vaginal delivery after being induced due to GDMA2. She met her post partum milestones. Social work was consulted due to hx anxiety on zoloft. She was stable for discharge on PPD#2.

## 2021-03-22 NOTE — OB PROVIDER LABOR PROGRESS NOTE - ASSESSMENT
Pt is a  at 38w4d admitted for IOL GDMA2 poorly controlled.   - c/w Pitocin   - Just pressed epidural button, will see if she feels relief. If she doesn't for top off.   - GBS neg

## 2021-03-22 NOTE — DISCHARGE NOTE OB - MEDICATION SUMMARY - MEDICATIONS TO STOP TAKING
I will STOP taking the medications listed below when I get home from the hospital:    ibuprofen 200 mg oral capsule  -- up to 3 cap(s) by mouth every 6 hours, As Needed    acetaminophen 500 mg oral capsule  -- 1 or 2 cap(s) by mouth every 6 hours, As Needed   I will STOP taking the medications listed below when I get home from the hospital:  None

## 2021-03-22 NOTE — DISCHARGE NOTE OB - PLAN OF CARE
c/w Zoloft daily. Recovery Patient is s/p an uncomplicated vaginal delivery. Continue with routine post partum care including pelvic rest for 6 weeks (no sex, tampons, douching, etc). Wellness

## 2021-03-22 NOTE — OB PROVIDER LABOR PROGRESS NOTE - ASSESSMENT
31yo  @ 38w 4d admitted for IOL  - fetal status - cat 1  - GBS neg  - pain control - epidural in place  - pt s/p CB and PO cytotec will start pitocin augmentation  D/W Dr Smita Lord PA-C

## 2021-03-22 NOTE — DISCHARGE NOTE OB - MEDICATION SUMMARY - MEDICATIONS TO TAKE
I will START or STAY ON the medications listed below when I get home from the hospital:    ibuprofen 200 mg oral capsule  -- up to 3 cap(s) by mouth every 6 hours, As Needed  -- Indication: For pain / cramping    Tylenol Regular Strength 325 mg oral tablet  -- 3 tab(s) by mouth   -- Indication: For pain/ cramping    sertraline 25 mg oral tablet  -- 1 tab(s) by mouth once a day  -- Indication: For anxiety     Prenatal Multivitamins with Folic Acid 1 mg oral tablet  -- 1 tab(s) by mouth once a day  -- Indication: For postpartum    I will START or STAY ON the medications listed below when I get home from the hospital:    ibuprofen 200 mg oral capsule  -- up to 3 cap(s) by mouth every 6 hours, As Needed  -- Indication: For pain / cramping    Tylenol Regular Strength 325 mg oral tablet  -- 3 tab(s) by mouth   -- Indication: For pain/ cramping    sertraline 25 mg oral tablet  -- 1 tab(s) by mouth once a day  -- Indication: For anxiety     Prenatal Multivitamins with Folic Acid 1 mg oral tablet  -- 1 tab(s) by mouth once a day  -- Indication: For postpartum vitamins

## 2021-03-22 NOTE — DISCHARGE NOTE OB - REASON FOR ADMISSION
Poorly controlled blood glucose - GDMA2 Induction of labor due to Poorly controlled blood glucose levels

## 2021-03-22 NOTE — OB PROVIDER LABOR PROGRESS NOTE - NS_SUBJECTIVE/OBJECTIVE_OBGYN_ALL_OB_FT
Pt c/o mild contractions    T(C): 36.4 (03-22-21 @ 06:34), Max: 36.9 (03-21-21 @ 16:20)  HR: 77 (03-22-21 @ 08:17) (61 - 96)  BP: 122/82 (03-22-21 @ 06:34) (104/65 - 160/80)  RR: 18 (03-22-21 @ 06:34) (18 - 18)  SpO2: 100% (03-22-21 @ 08:17) (95% - 100%)
Pt c/o increased contractions and pain    T(C): 37.0 (03-22-21 @ 12:49), Max: 37.0 (03-22-21 @ 12:49)  HR: 70 (03-22-21 @ 15:12) (58 - 98)  BP: 128/74 (03-22-21 @ 15:10) (91/51 - 160/80)  RR: 18 (03-22-21 @ 12:49) (18 - 20)  SpO2: 100% (03-22-21 @ 15:12) (87% - 100%)
Pt comfortable with epidural    T(C): 36.9 (03-22-21 @ 10:54), Max: 36.9 (03-21-21 @ 16:20)  HR: 65 (03-22-21 @ 12:02) (58 - 98)  BP: 110/71 (03-22-21 @ 11:54) (101/57 - 160/80)  RR: 18 (03-22-21 @ 10:54) (18 - 20)  SpO2: 99% (03-22-21 @ 12:02) (87% - 100%)
Pt feeling "bladder pressure", no rectal pressure.

## 2021-03-23 LAB — KLEIHAUER-BETKE CALCULATION: 0.1 % — SIGNIFICANT CHANGE UP (ref 0–0.3)

## 2021-03-23 RX ADMIN — Medication 600 MILLIGRAM(S): at 12:15

## 2021-03-23 RX ADMIN — Medication 975 MILLIGRAM(S): at 09:11

## 2021-03-23 RX ADMIN — SODIUM CHLORIDE 3 MILLILITER(S): 9 INJECTION INTRAMUSCULAR; INTRAVENOUS; SUBCUTANEOUS at 00:04

## 2021-03-23 RX ADMIN — Medication 975 MILLIGRAM(S): at 20:19

## 2021-03-23 RX ADMIN — Medication 600 MILLIGRAM(S): at 12:45

## 2021-03-23 RX ADMIN — Medication 1 TABLET(S): at 12:14

## 2021-03-23 RX ADMIN — Medication 975 MILLIGRAM(S): at 20:50

## 2021-03-23 RX ADMIN — SIMETHICONE 80 MILLIGRAM(S): 80 TABLET, CHEWABLE ORAL at 12:17

## 2021-03-23 RX ADMIN — SERTRALINE 25 MILLIGRAM(S): 25 TABLET, FILM COATED ORAL at 17:48

## 2021-03-23 RX ADMIN — Medication 600 MILLIGRAM(S): at 06:52

## 2021-03-23 RX ADMIN — Medication 600 MILLIGRAM(S): at 17:48

## 2021-03-23 RX ADMIN — Medication 600 MILLIGRAM(S): at 07:30

## 2021-03-23 RX ADMIN — Medication 600 MILLIGRAM(S): at 18:20

## 2021-03-23 RX ADMIN — Medication 975 MILLIGRAM(S): at 09:45

## 2021-03-23 NOTE — DIETITIAN INITIAL EVALUATION ADULT. - ADD RECOMMEND
Post-partum GDM diet education provided: 1) Increased risk of developing T2DM with GDM and ways to help prevent development. 2) 4-12 week fasting oral glucose tolerance test follow-up as outpatient 3) General healthful diet and physical activity recommendations discussed 4) Increased energy needs with breastfeeding. After-delivery GDM education handout provided.

## 2021-03-23 NOTE — DIETITIAN INITIAL EVALUATION ADULT. - CHIEF COMPLAINT
Pt is a 30 year old  PPD#1 s/p , antepartum course significant for GDMA2 (Insulin). This was pt's first GDM pregnancy. She plans on breastfeeding infant.

## 2021-03-24 VITALS
SYSTOLIC BLOOD PRESSURE: 112 MMHG | HEART RATE: 67 BPM | TEMPERATURE: 98 F | RESPIRATION RATE: 18 BRPM | OXYGEN SATURATION: 98 % | DIASTOLIC BLOOD PRESSURE: 71 MMHG

## 2021-03-24 PROCEDURE — G0463: CPT

## 2021-03-24 PROCEDURE — 85610 PROTHROMBIN TIME: CPT

## 2021-03-24 PROCEDURE — 82962 GLUCOSE BLOOD TEST: CPT

## 2021-03-24 PROCEDURE — 59050 FETAL MONITOR W/REPORT: CPT

## 2021-03-24 PROCEDURE — 85460 HEMOGLOBIN FETAL: CPT

## 2021-03-24 PROCEDURE — 83615 LACTATE (LD) (LDH) ENZYME: CPT

## 2021-03-24 PROCEDURE — 86900 BLOOD TYPING SEROLOGIC ABO: CPT

## 2021-03-24 PROCEDURE — 82010 KETONE BODYS QUAN: CPT

## 2021-03-24 PROCEDURE — 86850 RBC ANTIBODY SCREEN: CPT

## 2021-03-24 PROCEDURE — 80053 COMPREHEN METABOLIC PANEL: CPT

## 2021-03-24 PROCEDURE — 85730 THROMBOPLASTIN TIME PARTIAL: CPT

## 2021-03-24 PROCEDURE — 59025 FETAL NON-STRESS TEST: CPT

## 2021-03-24 PROCEDURE — 86769 SARS-COV-2 COVID-19 ANTIBODY: CPT

## 2021-03-24 PROCEDURE — 84550 ASSAY OF BLOOD/URIC ACID: CPT

## 2021-03-24 PROCEDURE — 87635 SARS-COV-2 COVID-19 AMP PRB: CPT

## 2021-03-24 PROCEDURE — 85384 FIBRINOGEN ACTIVITY: CPT

## 2021-03-24 PROCEDURE — 86901 BLOOD TYPING SEROLOGIC RH(D): CPT

## 2021-03-24 PROCEDURE — 85025 COMPLETE CBC W/AUTO DIFF WBC: CPT

## 2021-03-24 PROCEDURE — 86780 TREPONEMA PALLIDUM: CPT

## 2021-03-24 RX ADMIN — Medication 600 MILLIGRAM(S): at 12:20

## 2021-03-24 RX ADMIN — Medication 1 TABLET(S): at 11:51

## 2021-03-24 RX ADMIN — Medication 975 MILLIGRAM(S): at 09:33

## 2021-03-24 RX ADMIN — SERTRALINE 25 MILLIGRAM(S): 25 TABLET, FILM COATED ORAL at 11:51

## 2021-03-24 RX ADMIN — Medication 600 MILLIGRAM(S): at 11:49

## 2021-03-24 RX ADMIN — Medication 975 MILLIGRAM(S): at 04:00

## 2021-03-24 RX ADMIN — Medication 975 MILLIGRAM(S): at 03:31

## 2021-03-24 RX ADMIN — Medication 975 MILLIGRAM(S): at 10:03

## 2021-03-24 NOTE — PROGRESS NOTE ADULT - ATTENDING COMMENTS
Ob Attg Note:  Pt is doing well.  I agree w/ the daily note entered today, and the plan of care.  Pt is cleared to be discharged home.
Pt seen and examined. Agree with resident note  meeting all PP milestones  emotionally feeling better today.  No panic attacks today, no feelings / plan of harming herself or others  Pt amenable to following up with social work today to ensure that proper and seamless mental health follow up and appts are in place.

## 2021-03-24 NOTE — PROGRESS NOTE ADULT - SUBJECTIVE AND OBJECTIVE BOX
OB Progress Note:  PPD#2    S: 31yo PPD#2 s/p . Patient feels well. Pain is well controlled. She is tolerating a regular diet and passing flatus. She is voiding spontaneously, and ambulating without difficulty. Denies CP/SOB. Denies lightheadedness/dizziness. Denies N/V.    O:  Vitals:   Vital Signs Last 24 Hrs  T(C): 36.7 (24 Mar 2021 05:30), Max: 36.7 (24 Mar 2021 05:30)  T(F): 98 (24 Mar 2021 05:30), Max: 98 (24 Mar 2021 05:30)  HR: 67 (24 Mar 2021 05:30) (62 - 78)  BP: 112/71 (24 Mar 2021 05:30) (112/71 - 125/85)  BP(mean): --  RR: 18 (24 Mar 2021 05:30) (18 - 18)  SpO2: 98% (24 Mar 2021 05:30) (98% - 99%)    MEDICATIONS  (STANDING):  acetaminophen   Tablet .. 975 milliGRAM(s) Oral <User Schedule>  diphtheria/tetanus/pertussis (acellular) Vaccine (ADAcel) 0.5 milliLiter(s) IntraMuscular once  ibuprofen  Tablet. 600 milliGRAM(s) Oral every 6 hours  measles/mumps/rubella Vaccine 0.5 milliLiter(s) SubCutaneous once  oxytocin Infusion 333.333 milliUNIT(s)/Min (1000 mL/Hr) IV Continuous <Continuous>  oxytocin Infusion 333.333 milliUNIT(s)/Min (1000 mL/Hr) IV Continuous <Continuous>  oxytocin Infusion. 4 milliUNIT(s)/Min (4 mL/Hr) IV Continuous <Continuous>  prenatal multivitamin 1 Tablet(s) Oral daily  sertraline 25 milliGRAM(s) Oral daily  sodium chloride 0.9% lock flush 3 milliLiter(s) IV Push every 8 hours    MEDICATIONS  (PRN):  benzocaine 20%/menthol 0.5% Spray 1 Spray(s) Topical every 6 hours PRN for Perineal discomfort  dibucaine 1% Ointment 1 Application(s) Topical every 6 hours PRN Perineal discomfort  diphenhydrAMINE 25 milliGRAM(s) Oral every 6 hours PRN Pruritus  hydrocortisone 1% Cream 1 Application(s) Topical every 6 hours PRN Moderate Pain (4-6)  lanolin Ointment 1 Application(s) Topical every 6 hours PRN nipple soreness  magnesium hydroxide Suspension 30 milliLiter(s) Oral two times a day PRN Constipation  oxyCODONE    IR 5 milliGRAM(s) Oral every 3 hours PRN Moderate to Severe Pain (4-10)  oxyCODONE    IR 5 milliGRAM(s) Oral once PRN Moderate to Severe Pain (4-10)  pramoxine 1%/zinc 5% Cream 1 Application(s) Topical every 4 hours PRN Moderate Pain (4-6)  simethicone 80 milliGRAM(s) Chew every 4 hours PRN Gas  witch hazel Pads 1 Application(s) Topical every 4 hours PRN Perineal discomfort      Labs:  Blood type: A Negative  Rubella IgG: RPR: Negative                          11.3<L>   12.56<H> >-----------< 231    (  @ 17:02 )             34.7    21 @ 17:02      136  |  105  |  10  ----------------------------<  73  3.8   |  17<L>  |  0.55        Ca    9.2      21 Mar 2021 17:02    TPro  6.6  /  Alb  3.3  /  TBili  0.2  /  DBili  x   /  AST  24  /  ALT  14  /  AlkPhos  92  21 @ 17:02    Physical Exam:  General: NAD  Abdomen: soft, non-tender, non-distended, fundus firm  Vaginal: Lochia wnl  Extremities: No erythema/edema
OB Progress Note:  PPD#1    S: 31yo PPD#1 s/p . Patient feels well. Pain is well controlled. She is tolerating a regular diet and passing flatus. She is voiding spontaneously, and ambulating without difficulty. Denies CP/SOB. Denies lightheadedness/dizziness. Denies N/V.    O:  Vitals:  Vital Signs Last 24 Hrs  T(C): 36.6 (23 Mar 2021 05:00), Max: 37.1 (22 Mar 2021 19:00)  T(F): 97.8 (23 Mar 2021 05:00), Max: 98.8 (22 Mar 2021 19:00)  HR: 68 (23 Mar 2021 05:00) (57 - 117)  BP: 133/86 (23 Mar 2021 05:00) (91/51 - 149/58)  BP(mean): --  RR: 18 (23 Mar 2021 05:00) (18 - 20)  SpO2: 98% (23 Mar 2021 05:00) (87% - 100%)    MEDICATIONS  (STANDING):  acetaminophen   Tablet .. 975 milliGRAM(s) Oral <User Schedule>  diphtheria/tetanus/pertussis (acellular) Vaccine (ADAcel) 0.5 milliLiter(s) IntraMuscular once  ibuprofen  Tablet. 600 milliGRAM(s) Oral every 6 hours  oxytocin Infusion 333.333 milliUNIT(s)/Min (1000 mL/Hr) IV Continuous <Continuous>  oxytocin Infusion 333.333 milliUNIT(s)/Min (1000 mL/Hr) IV Continuous <Continuous>  oxytocin Infusion. 4 milliUNIT(s)/Min (4 mL/Hr) IV Continuous <Continuous>  prenatal multivitamin 1 Tablet(s) Oral daily  sertraline 25 milliGRAM(s) Oral daily  sodium chloride 0.9% lock flush 3 milliLiter(s) IV Push every 8 hours      Labs:  Blood type: A Negative  Rubella IgG: RPR: Negative                          11.3<L>   12.56<H> >-----------< 231    (  @ 17:02 )             34.7    21 @ 17:02      136  |  105  |  10  ----------------------------<  73  3.8   |  17<L>  |  0.55        Ca    9.2      21 Mar 2021 17:02    TPro  6.6  /  Alb  3.3  /  TBili  0.2  /  DBili  x   /  AST  24  /  ALT  14  /  AlkPhos  92  21 @ 17:02    Physical Exam:  General: NAD  Abdomen: soft, non-tender, non-distended, fundus firm  Vaginal: Lochia wnl  Extremities: No erythema/edema
OB Progress Note:  PPD#1    S: 31yo  PPD#1 s/p . Patient feels well. Pain is well controlled. She is tolerating a regular diet and passing flatus. She is voiding spontaneously, and ambulating without difficulty. Denies CP/SOB. Denies lightheadedness/dizziness. Denies N/V.    O:  Vitals:  Vital Signs Last 24 Hrs  T(C): 36.6 (23 Mar 2021 05:00), Max: 37.1 (22 Mar 2021 19:00)  T(F): 97.8 (23 Mar 2021 05:00), Max: 98.8 (22 Mar 2021 19:00)  HR: 68 (23 Mar 2021 05:00) (57 - 117)  BP: 133/86 (23 Mar 2021 05:00) (91/51 - 149/58)  BP(mean): --  RR: 18 (23 Mar 2021 05:00) (18 - 20)  SpO2: 98% (23 Mar 2021 05:00) (87% - 100%)    MEDICATIONS  (STANDING):  acetaminophen   Tablet .. 975 milliGRAM(s) Oral <User Schedule>  diphtheria/tetanus/pertussis (acellular) Vaccine (ADAcel) 0.5 milliLiter(s) IntraMuscular once  ibuprofen  Tablet. 600 milliGRAM(s) Oral every 6 hours  oxytocin Infusion 333.333 milliUNIT(s)/Min (1000 mL/Hr) IV Continuous <Continuous>  oxytocin Infusion 333.333 milliUNIT(s)/Min (1000 mL/Hr) IV Continuous <Continuous>  oxytocin Infusion. 4 milliUNIT(s)/Min (4 mL/Hr) IV Continuous <Continuous>  prenatal multivitamin 1 Tablet(s) Oral daily  sertraline 25 milliGRAM(s) Oral daily  sodium chloride 0.9% lock flush 3 milliLiter(s) IV Push every 8 hours      Labs:  Blood type: A Negative  Rubella IgG: RPR: Negative                          11.3<L>   12.56<H> >-----------< 231    (  @ 17:02 )             34.7    21 @ 17:02      136  |  105  |  10  ----------------------------<  73  3.8   |  17<L>  |  0.55        Ca    9.2      21 Mar 2021 17:02    TPro  6.6  /  Alb  3.3  /  TBili  0.2  /  DBili  x   /  AST  24  /  ALT  14  /  AlkPhos  92  21 @ 17:02          Physical Exam:  General: NAD  Abdomen: soft, non-tender, non-distended, fundus firm  Vaginal: Lochia wnl  Extremities: No erythema/edema

## 2021-03-24 NOTE — PROGRESS NOTE ADULT - PROBLEM SELECTOR PLAN 1
- Pain well controlled, continue current pain regimen  - Increase ambulation, SCDs when not ambulating  - Continue regular diet  - SW consult placed given h/o anxiety    Kailyn Harris MD PGY1
- Pain well controlled, continue current pain regimen  - Increase ambulation, SCDs when not ambulating  - Continue regular diet    Kailyn Harris MD PGY1
- Pain well controlled, continue current pain regimen  - Increase ambulation, SCDs when not ambulating  - Continue regular diet  - Continue home Zoloft    Domo PGY2

## 2021-03-24 NOTE — PROGRESS NOTE ADULT - ASSESSMENT
A/P: 31yo PPD#1 s/p .  Patient is stable and doing well post-partum. 
A/P: 31yo PPD#1 s/p .  Patient is stable and doing well post-partum.   
A/P: 29yo PPD#2 s/p .  Patient is stable and doing well post-partum.

## 2021-11-15 NOTE — OB PROVIDER LABOR PROGRESS NOTE - ASSESSMENT
29yo  @ 38w 4 d admitted for IOL  - fetal status - cat 1  - GBS neg  - epidural bolus requested  - continue pitocin augmentation  Mirella Lord PA-C Left message on machine for patient to call back. Made aware Dr. Kusum Gonzalez out of the office this week. Unsure what called is about.

## 2022-01-19 NOTE — DISCHARGE NOTE OB - NS OB DC IMMUNIZATIONS MMR YN
[address] Note    Date: 1/19/2022                                               Saravanan Matson:     Chief Complaint   Patient presents with    Pre-op Exam     knee surgery        HPI   Patient is here for preop exam.  Is having knee surgery on 2/4/22 with Dr. Melody Lujan. She can walk a good distance without chest pain or shortness of breath. Is limited only by her knee pain. Can climb stairs and do moderate housework. Denies any personal or family history of blood clots or reactions anesthesia. Patient Active Problem List    Diagnosis Date Noted    Trigger ring finger of left hand 11/11/2020    Poor fetal growth affecting management of mother in third trimester 12/07/2018    Normal labor 12/07/2018    Poor fetal growth affecting management of mother in third trimester 12/07/2018    Normal vaginal delivery 12/07/2018    Vaginal delivery 12/07/2018    Threatened labor 11/16/2018    Chest pain 09/18/2015    Injury of ankle, right 03/28/2014    Fracture, fibula 03/28/2014       Past Medical History:   Diagnosis Date    Abnormal Pap smear of cervix     in past, recent normal    ADHD     Anemia     Cervix dysplasia     Depression     no meds currently    Kidney stones     kidney stones    Postpartum depression     Zoloft helped       Current Outpatient Medications   Medication Sig Dispense Refill    ibuprofen (ADVIL;MOTRIN) 200 MG tablet Take 200 mg by mouth every 6 hours as needed for Pain      amphetamine-dextroamphetamine (ADDERALL XR) 30 MG extended release capsule TAKE 1 CAPSULE BY MOUTH 1 TIME A DAY IN THE MORNING      FLUoxetine (PROZAC) 20 MG capsule Take 40 mg by mouth daily        No current facility-administered medications for this visit.        Allergies   Allergen Reactions    Demerol      UNKNOWN    Monistat [Tioconazole] Itching     Burning, blisters    Pineapple Itching    Tramadol      Sweating nausea dizziness       Review of Systems   No fever, no cough, no vomiting, no dysuria, no headache, no seizure, no ankle swelling, no rash, no bruise, no LAD    Vitals:  /80   Pulse 79   Temp 97.8 °F (36.6 °C)   Ht 5' 8\" (1.727 m)   Wt 255 lb (115.7 kg)   SpO2 96%   BMI 38.77 kg/m²     Wt Readings from Last 3 Encounters:   01/19/22 255 lb (115.7 kg)   01/06/22 247 lb (112 kg)   11/04/21 247 lb (112 kg)        Physical Exam   General:  Well-appearing, NAD, alert, non-toxic  HEENT:  Normocephalic, atraumatic. Pupils equal and round. TMs pearly with good landmarks. Moist mucous membranes. Normal dentition  NECK:  Supple, normal range of motion, no LAD, no meningeal signs, no JVD, nontender  CHEST/LUNGS: CTAB, no crackles, no wheeze, no rhonchi. Symmetric rise  CARDIOVASCULAR: RRR,  no murmur, no rub  ABDOMEN: Soft, non-tender, non-distended. No masses  EXTREMETIES: Normal movement of all extremities. No edema. No joint swelling. SKIN:  No rash, no cellulitis, no bruising, no petechiae/purpura/vesicles/pustules/abscess  PSYCH:  A+O x 3; normal affect  NEURO:  GCS 15, CN2-12 grossly intact, no focal motor/sensory deficits, no cerebellar deficits, normal gait, normal speech      Assessment/Plan     39yo female here for pre-op exam. No sign of heart failure. VSS. No sign of active infection. She is cleared for the procedure. Discharged in stable condition at 16:27.    1. Need for vaccination  -     INFLUENZA, MDCK QUADV, 2 YRS AND OLDER, IM, PF, PREFILL SYR OR SDV, 0.5ML (FLUCELVAX QUADV, PF)  2. Pre-op exam  3. Annual physical exam  -     Lipid, Fasting; Future  -     TSH with Reflex;  Future  -     Hemoglobin A1C; Future       Orders Placed This Encounter   Procedures    INFLUENZA, MDCK QUADV, 2 YRS AND OLDER, IM, PF, PREFILL SYR OR SDV, 0.5ML (FLUCELVAX QUADV, PF)    Lipid, Fasting     Standing Status:   Future     Standing Expiration Date:   1/19/2023    TSH with Reflex     Standing Status:   Future     Standing Expiration Date:   1/19/2023   [place] Hemoglobin A1C     Standing Status:   Future     Standing Expiration Date:   1/19/2023       No follow-ups on file.     Omari Torres MD, MD    1/19/2022  4:27 PM No

## 2022-07-20 NOTE — ED ADULT TRIAGE NOTE - [facility]
INTERVAL HPI/OVERNIGHT EVENTS:  Pt seen and examined at bedside.     Allergies/Intolerance: No Known Allergies      MEDICATIONS  (STANDING):  ascorbic acid 500 milliGRAM(s) Oral daily  atorvastatin 10 milliGRAM(s) Oral at bedtime  ciprofloxacin     Tablet 500 milliGRAM(s) Oral every 12 hours  enoxaparin Injectable 40 milliGRAM(s) SubCutaneous every 24 hours  hydrocortisone sodium succinate IVPB 50 milliGRAM(s) IV Intermittent every 8 hours  insulin lispro (ADMELOG) corrective regimen sliding scale   SubCutaneous three times a day with meals  insulin lispro (ADMELOG) corrective regimen sliding scale   SubCutaneous at bedtime  multivitamin 1 Tablet(s) Oral daily  polyethylene glycol 3350 17 Gram(s) Oral daily  potassium chloride    Tablet ER 20 milliEquivalent(s) Oral daily  senna 2 Tablet(s) Oral at bedtime  thiamine 100 milliGRAM(s) Oral daily    MEDICATIONS  (PRN):        ROS: all systems reviewed and wnl      PHYSICAL EXAMINATION:  Vital Signs Last 24 Hrs  T(C): 36.7 (20 Jul 2022 09:06), Max: 36.7 (20 Jul 2022 09:06)  T(F): 98 (20 Jul 2022 09:06), Max: 98 (20 Jul 2022 09:06)  HR: 74 (20 Jul 2022 09:06) (34 - 74)  BP: 107/64 (20 Jul 2022 09:06) (107/64 - 126/70)  BP(mean): --  RR: 18 (20 Jul 2022 09:06) (18 - 18)  SpO2: 99% (20 Jul 2022 09:06) (99% - 100%)    Parameters below as of 20 Jul 2022 09:06  Patient On (Oxygen Delivery Method): room air      CAPILLARY BLOOD GLUCOSE      POCT Blood Glucose.: 113 mg/dL (20 Jul 2022 08:39)  POCT Blood Glucose.: 141 mg/dL (19 Jul 2022 21:27)  POCT Blood Glucose.: 142 mg/dL (19 Jul 2022 17:24)  POCT Blood Glucose.: 136 mg/dL (19 Jul 2022 11:56)      07-19 @ 07:01  -  07-20 @ 07:00  --------------------------------------------------------  IN: 100 mL / OUT: 1050 mL / NET: -950 mL    07-20 @ 07:01  -  07-20 @ 10:24  --------------------------------------------------------  IN: 240 mL / OUT: 0 mL / NET: 240 mL        GENERAL:   NECK: supple, No JVD  CHEST/LUNG: clear to auscultation bilaterally; no rales, rhonchi, or wheezing b/l  HEART: normal S1, S2  ABDOMEN: BS+, soft, ND, NT   EXTREMITIES:  pulses palpable; no clubbing, cyanosis, or edema b/l LEs  SKIN: no rashes or lesions      LABS:                        10.2   8.47  )-----------( 174      ( 20 Jul 2022 06:59 )             32.1     07-20    123<L>  |  89<L>  |  13  ----------------------------<  712<HH>  2.6<LL>   |  24  |  0.46<L>    Ca    7.3<L>      20 Jul 2022 07:07  Mg     1.5     07-19                 EKG/Face Mask INTERVAL HPI/OVERNIGHT EVENTS:  Pt seen and examined at bedside.     Allergies/Intolerance: No Known Allergies      MEDICATIONS  (STANDING):  ascorbic acid 500 milliGRAM(s) Oral daily  atorvastatin 10 milliGRAM(s) Oral at bedtime  ciprofloxacin     Tablet 500 milliGRAM(s) Oral every 12 hours  enoxaparin Injectable 40 milliGRAM(s) SubCutaneous every 24 hours  hydrocortisone sodium succinate IVPB 50 milliGRAM(s) IV Intermittent every 8 hours  insulin lispro (ADMELOG) corrective regimen sliding scale   SubCutaneous three times a day with meals  insulin lispro (ADMELOG) corrective regimen sliding scale   SubCutaneous at bedtime  multivitamin 1 Tablet(s) Oral daily  polyethylene glycol 3350 17 Gram(s) Oral daily  potassium chloride    Tablet ER 20 milliEquivalent(s) Oral daily  senna 2 Tablet(s) Oral at bedtime  thiamine 100 milliGRAM(s) Oral daily    MEDICATIONS  (PRN):        ROS: all systems reviewed and wnl      PHYSICAL EXAMINATION:  Vital Signs Last 24 Hrs  T(C): 36.7 (20 Jul 2022 09:06), Max: 36.7 (20 Jul 2022 09:06)  T(F): 98 (20 Jul 2022 09:06), Max: 98 (20 Jul 2022 09:06)  HR: 74 (20 Jul 2022 09:06) (34 - 74)  BP: 107/64 (20 Jul 2022 09:06) (107/64 - 126/70)  BP(mean): --  RR: 18 (20 Jul 2022 09:06) (18 - 18)  SpO2: 99% (20 Jul 2022 09:06) (99% - 100%)    Parameters below as of 20 Jul 2022 09:06  Patient On (Oxygen Delivery Method): room air      CAPILLARY BLOOD GLUCOSE      POCT Blood Glucose.: 113 mg/dL (20 Jul 2022 08:39)  POCT Blood Glucose.: 141 mg/dL (19 Jul 2022 21:27)  POCT Blood Glucose.: 142 mg/dL (19 Jul 2022 17:24)  POCT Blood Glucose.: 136 mg/dL (19 Jul 2022 11:56)      07-19 @ 07:01  -  07-20 @ 07:00  --------------------------------------------------------  IN: 100 mL / OUT: 1050 mL / NET: -950 mL    07-20 @ 07:01  -  07-20 @ 10:24  --------------------------------------------------------  IN: 240 mL / OUT: 0 mL / NET: 240 mL        GENERAL: stable, in bed, comfortable, coronel in place.   NECK: supple, No JVD  CHEST/LUNG: clear to auscultation bilaterally; no rales, rhonchi, or wheezing b/l  HEART: normal S1, S2  ABDOMEN: BS+, soft, ND, NT   EXTREMITIES:  pulses palpable; no clubbing, cyanosis, or edema b/l LEs  SKIN: no rashes or lesions      LABS:                        10.2   8.47  )-----------( 174      ( 20 Jul 2022 06:59 )             32.1     07-20    123<L>  |  89<L>  |  13  ----------------------------<  712<HH>  2.6<LL>   |  24  |  0.46<L>    Ca    7.3<L>      20 Jul 2022 07:07  Mg     1.5     07-19

## 2023-12-28 NOTE — DISCHARGE NOTE OB - PATIENT PORTAL LINK FT
Endocrinology Clinic Note    Name: Rey Smalls    Date: 12/28/2023    HISTORY OF PRESENT ILLNESS   Rey Smalls is a 77year old male with PMHx significant for atrial fibrillation dx'd in July 2022, HTN, CAD who presents for consultation for thyrotoxicosis    Initial HPI consult in July 2023  Thyroid hx:  (-) Fhx of thyroid disease   Thyroid dysfunction dates back to Nov 2020, TSH: <0.005, fT4: 3.07  Works as a  and farmer, has felt \"absolutely great\" over the last few years and self-describes as generally healthy which is why he delayed seeking care for abnormal thyroid tests  Dx'd with afib 2 years ago on a work physical, s/p 2 attempts cardioversion, on amiodarone.  Follows with cardiology  No known fam hx of autoimmunity    11/2020, TSH: <0.005, fT4: 3.07  7/2023: TSH - <0.005, fT4 - 2.40    Pt endorses: amiodarone use and biotin use (taking Centrum MVI w/ 100% biotin daily)    Pt denies: fatigue/weakness, temperature intolerance, tremor, palpitations, vision changes, hair loss, hyperdefectation, dysphagia, and unexplained weight loss recent illness, weight loss medication use, and recent IV contrast for imaging      Interim hx:  Oct 2023-  10/2023- TSH <0.005, fT4  2.90, total T3 189  Feeling generally well, noting some joint pain but has improved, some fatigue  On MMI 30mg daily, taking 10mg TID and hoping to change to BID dosing  Follows with Dr. Joaquín Liang with [address] [place] [address]- he has not discussed his hyperTH diagnosis with cards yet    Dec 2023-   12/2023 labs: TSH <0.005, fT4 1.8, total T3 144, taking MMI 30mg daily (splits dose into two, 15mg doses)  Feeling well, he has never felt symptomatic of his hyperTH  Has upcoming ablation scheduled w/ cardiology, he is not sure about proceeding/asking about thyroid involvement with the afib      REVIEW OF SYSTEMS  Ten point review of systems has been performed and is otherwise negative and/or non-contributory, except as described above.    Medications:     Current Outpatient Medications:     methIMAzole 10 MG Oral Tab, Take 1.5 tablets (15 mg total) by mouth in the morning and 1.5 tablets (15 mg total) before bedtime. , Disp: 270 tablet, Rfl:     amiodarone 200 MG Oral Tab, 1 tablet (200 mg total) every morning., Disp: , Rfl:     apixaban 5 MG Oral Tab, Take 1 tablet (5 mg total) by mouth 2 (two) times daily. , Disp: , Rfl:     enalapril 5 MG Oral Tab, Take 1 tablet (5 mg total) by mouth daily. , Disp: 30 tablet, Rfl: 0    OMEPRAZOLE 20 MG Oral Capsule Delayed Release, TAKE 1 CAPSULE BY MOUTH EVERY DAY, Disp: 90 capsule, Rfl: 0    Multiple Vitamins-Minerals (ONE-A-DAY MENS 50+ ADVANTAGE) Oral Tab, Take 1 tablet by mouth daily. , Disp: , Rfl:     Rosuvastatin Calcium 10 MG Oral Tab, Take 1 tablet (10 mg total) by mouth nightly., Disp: , Rfl:     aspirin 81 MG Oral Chew Tab, Chew 2 tablets (162 mg total) by mouth daily. , Disp: , Rfl:      Allergies:   No Known Allergies    Social History:   Social History     Socioeconomic History    Marital status: Single   Tobacco Use    Smoking status: Never    Smokeless tobacco: Never   Vaping Use    Vaping Use: Never used   Substance and Sexual Activity    Alcohol use: Yes     Comment: social    Drug use: Never   Other Topics Concern    Caffeine Concern Yes     Comment: 2 cups of coffee every morning and sometimes pop during the day.      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       Medical History:   Past Medical History:   Diagnosis Date    Essential hypertension     High blood pressure     High cholesterol     Hyperlipidemia          Surgical history:   Past Surgical History:   Procedure Laterality Date    COLONOSCOPY      OTHER      right shoulder        PHYSICAL EXAMINATION:  /80   Pulse 67   SpO2 98%     CONSTITUTIONAL:  awake, alert, cooperative, no apparent distress, and appears stated age  PSYCH: normal affect  EYES:  No proptosis,  conjunctiva normal, no lid lag or stare  ENT: Normocephalic, atraumatic  NECK:  Symmetrical diffusely enlarged thyroid gland, non tender  LUNGS: breathing comfortably  ABDOMEN:  soft  SKIN:  no rashes and no lesions  EXTREMITIES: no edema    Labs:  11/2020: TSH: <0.005, fT4: 3.07  7/2023: TSH - <0.005, fT4 - 2.40  8/2023: TSH <0.005, fT4: 3.60, total T3 187, TSI 3.96  9/2023: TSH <0.005, fT4: 2.90, total T3 187  12/2023: TSH <0.005, fT4 1.8, total T3 144    Imaging:  Sept 2023 ultrasound:   Narrative   PROCEDURE:  US THYROID (IFS=[id number])     COMPARISON:  None. INDICATIONS:  E05.90 Thyrotoxicosis without thyroid storm, unspecified thyrotoxicosis type     TECHNIQUE:  High-resolution ultrasound of the thyroid gland was performed. PATIENT STATED HISTORY: (As transcribed by Technologist)  Thyrotoxicosis     FINDINGS:       MEASUREMENTS:  RIGHT LOBE:  6.5 x 3.9 x 3.8 cm  LEFT LOBE:  6.4 x 3.5 x 2.6 cm  ISTHMUS:  1.1 cm     NODULES:  None. OTHER:  None. Impression   CONCLUSION:  TR1 - Benign (No FNA). Overall enlarged thyroid gland. ASSESSMENT/PLAN:  (E05.00) Graves disease  (primary encounter diagnosis)  (E05.90) Thyrotoxicosis without thyroid storm, unspecified thyrotoxicosis type  (primary encounter diagnosis)  Plan: TSH+FREE T4, TRIIODOTHYRONINE (T3) TOTAL,         THYROID STIMULATING IMMUNOGLOB, US THYROID         (GUM=[id number])    Biochemically thyrotoxic dating back to 7/2020; dx'd with a-fib mid-2021 per pt, MMI treatment beginning in July 2023. (+)TSI antibodies c/w Graves. We discussed untreated Graves likely contributed to development of afib. Amiodarone use is contributing to the need for high-dose MMI to achieve euthyroidism. 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 Last visit, attempted prednisone burst/taper to eval if more rapid TFT improvement, which would be more consistent with type 2 AIT,  however pt did not complete labs within time frame requested.   Thus, difficult to ascertain if improvement is attributable to steroids vs. MMI. TFTs have trended nicely downward on MMI. Plan to continue MMI at 30mg daily. Pt is concerned re: upcoming ablation procedure; advised to discuss further with cardiologist. Endo staff to fax records to cardiology. - continue MMI 30mg daily (he is taking in split doses- 15mg (1.5 tab) qAM, 15mg (1.5 tab) qPM)  - TFs in 8 weeks (after 1/28)  - If amiodarone is discontinued, will still require high dose MMI for some time given its long half life. Would continue watchful monitoring of TFTs (Every 6-8 weeks), and wean MMI as tolerated  - Long term plan with Graves is to wean MMI as tolerated over the course of 12-18 months while maintaining euthyroid state  - counseled on potential SE of MMI  - no opthalmopathy on exam, ref'd to optho for baseline testing given longstanding untreated Graves    (R73.01) Elevated fasting glucose  Plan: FBG on , concerned about DM.  NO fam hx.  - A1C 5.2% in office, normal.  - continue f/u with PCP for routine labs    Return in about 6 weeks (around 2/8/2024) for thyroid follow up.    12/28/2023  BRUCE Driscoll “You can access the [facility] Patient Portal, offered by [facility], by registering with the following website: [email or web address]”

## 2024-08-27 NOTE — OB RN PATIENT PROFILE - AS [facility]
Subjective   Nguyễn Santana is a 59 y.o. male who presents today for evaluation of skin lesion and due for follow-up of hypertension, leukocytosis, easy bruising, chest pain, GERD, LUQ pain, GI symptoms, anxiety, and specialists for hyperlipidemia, hypothyroidism, vitamin D deficiency, COPD, pulmonary nodules, and history of pneumonia.      HPI    Spot on his nose for a few weeks. He tried antibiotic and steroid cream.     Rash- still has spots on arms. Has not received appt for dermatology.   He has spots on arms for a couple months. He has tried antibiotic ointment without relief. He started left arm then went to right arm. Brother said had similar with skin cancer but is extensor surface of the forearms. No other skin lesions. Only on forearms. Not on upper arm.    Hypertension- blood pressure up and down- taking Bisoprolol 5 mg daily, Norvasc 5 mg daily. When BP > 130, feels bad. If 150 systolic, he takes another tablet, BP improves and feels better    taking Lisinopril 5 mg once daily. BP is still labile- changes with position changes.  He had not checked until last week when he felt bad. /89. He knew he needed to get checked out.   His BP goes up and down some. BP goes up when he has increased anxiety- pulmonologist advised he will have this with taking albuterol so often.  Previously stopped Spiriva and Advair and started Trelegy. Helps but does not last as long.  He did not have to use emergency inhaler so often.   Leukocytosis- told that from daily prednisone.   Still bruising a lot- he is still on daily prednisone from pulmonology. He reports barely hitting himself results in increased bruising. No bruising on legs, trunk, back. He has only on bilateral hands and arms. No blood in urine or stool. No nosebleeds or other bleeding.    Anxiety, moods- doing ok. No need for specialist. Still caring for grandparents  He had increased stress- wife  in April. She was not feeling well- took her to  hospital. Heart attack while in the hospital and tried CPR. Called easter morning and told him she passed away.   She was paralyzed from waist down from CVA 3 years ago. She was doing ok then did not want to eat any longer and would get weak because she would not eat. They put tube in but scraped her stomach and caused bleeding. Had to cauterize that. She then had a hard time breathing and could not put on ventilator. Put on BIPAP.   He has custody of 5 grandkids.   Sometimes less anxiety. A few years ago, Breanna put him on something for anxiety- he felt medicine head and did not like that. He was given Celexa previously. Also given Xanax very briefly. When something goes wrong, he gets upset and has to try to calm anxiety down.   Caring for 5 grandchildren and his wife had CVA with significant deficit.     Chest pressure, diaphoresis- no further CP but has sweating.   Patient went to the emergency department 7/8/2023 for chest pressure with associated diaphoresis.  He had pain from his right shoulder that went across his chest into his left shoulder.  He denied shortness of breath, change in cough, fever, swelling or other pain.  Patient had elevated blood pressure.  He was given ibuprofen and baclofen.  Labs-WBC 13.3 with elevated neutrophils, negative high-sensitivity troponin, negative BNP.  Trace protein in urine.  Negative D-dimer.  Chest x-ray negative.  He reports his BP was up and down all day. He woke up with pain in right arm, across chest and all the way down left arm. He had headache. Went to sleep and woke up and felt poorly.   /89. He knew he needed to get checked out.   They gave 4 baby aspirin and felt better but not 100%. BP improved and they released to follow up here and cardiology.   No neck pain.   After leaving, they put on ibuprofen and Baclofen. When stood up and laid down, pain resolved in right arm/ shoulder but sitting had pain in right shoulder and arm. He has had improvement.  Feeling about 60% improvement.     GERD- taking Protonix 40 mg daily and underwent abd US with GI. Consideration of EGD and gastric emptying study at follow up 9/19/2024 with symptoms.   Previously he reported worsening symptoms-when he tries to eat, he has significant pain. They gave him medication in the hospital and it worked really.   Left upper quadrant pain-He underwent EGD with gastritis-positive H. pylori and internal hemorrhoids and colonoscopy with 1 tubulovillous adenoma polyp.  Treated with Protonix, bismuth, tetracycline, and Flagyl.  Advised follow-up with APRN 4 weeks from 12/2022 and repeat H. pylori testing in 8 weeks.  Repeat colonoscopy in 3 years.  They ordered gastric emptying study-had to reschedule while his wife is on the hospital. Antibiotics helped but depends on what he eats- still has pain.   Patient reported pain in LUQ under his ribs since 2014 when Dx with COPD. Got up and was having no problems. He got up and started moving around and had tingling in both arms, both legs, pressure behind both eyes, and pain under ribs. /137. Went to ER.   Patient was seen in the ER 6/29/2021 for left chest pain.  Negative troponin.  Elevated WBC and macrocytosis- patient was told from chronic steroid.  Chest x-ray without acute changes.  Patient was started on lisinopril for elevated blood pressure. He reports he took medication and slept all day x 2 days. He then started 1/2 twice daily- tolerating better but feels medicine head and dizzy with standing up. They warned him about this but he does not tolerate a lot of medications.   When eating, getting bloated. Stomach hurts then improves. Does not matter what he eats, he has symptoms. GB remains.  Had EGD 2014 when he had symptoms in the past.   Negative gallbladder ultrasound and HIDA scan 9/2022.  CT abdomen pelvis 9/2022- 15 mm simple cyst left kidney.  Otherwise negative.  8/30/2022-patient developed left upper abdominal pain last  week-reporting sharp pain.  He also had blood pressure elevated 170/90, tingling in bilateral legs and extreme fatigue.  He took his blood pressure medication, laid down, and woke up feeling normal.  He had this happen 5 years ago with negative cardiac work-up.  CT with fatty area on site where he felt stabbing pain.    Flexeril given for pain and only taken twice.  Pain in left tricep- depends on position. If he stands up, it resolves. Tylenol resolves as well. Does not feel he needs imaging or PT.   Numbness/tingling arms and legs- sometimes- still comes ago- still positional. Not worsening.   Every once in a while, numbness and tingling in arms and legs from knee down. Then moves and goes away.     He is following with endocrinology-SHANT Watts with last appointment 6/2023  Hypothyroidism-changed thyroid 100 mcg 5 days per week and 112 mcg 2 days per week.   Cholesterol- still on Lovastatin 10 mg. They have not mentioned cholesterol.   Vitamin D- taking vitamin D 50,000 IU once weekly. Prescribed by endocrinology.     He is following with pulmonology- last appt last month with follow up this month. Scheduled for procedure. When they called to schedule, he had questions and they were going to get back with provider and let him know.   COPD-pulmonology- wants to consider transplant. Patient does not want to take. Put on Spiriva. Still on Prednisone, albuterol, Mucinex intermittent, Beztri, Singulair 10 mg   Influenza, pneumonia-  Patient was hospitalized 3/1/2024 through 3/4/2024 for acute on chronic hypoxic respiratory failure with influenza A, community-acquired pneumonia, hyponatremia, elevated troponin, hypothyroidism, hyperlipidemia, hypertension, GERD. Per discharge summary, he presented to the emergency department with 2 to 3 days of progressive dyspnea and had to wear his oxygen consistently throughout the day rather than just at night.  He also had intermittent low-grade fevers and a dry  nonproductive cough.  He required 4 L oxygen nasal cannula with positive respiratory panel for influenza A.  Chest x-ray with possible pneumonia and procalcitonin was mildly elevated at 0.28, so he was started on CAP treatment with azithromycin and ceftriaxone.  He was also started on steroids for possible COPD exacerbation.  He was given DuoNebs and oseltamivir.  He had reaction to azithromycin after his second dose Tory felt hot and flushed, so medication was discontinued.  He was also found to be hyponatremic at 132 resolved by the time of discharge.  He was weaned to room air and passed a walk test without significant desaturation was stable for discharge.  Cardiology assessed during the ER and thought elevation of troponin was due to demand ischemia and unrelated to CAD.  Labs-respiratory culture-positive for influenza A.  BNP normal, troponin I elevated at 27 and repeat elevated at 88, procalcitonin 0.28, lactic acid normal.  CMP with sodium 132, chloride 96, calcium 8.8, BUN 25, AST 41.  Urine with positive blood, ketones, protein  Chest x-ray with 31 2204-calcifications thoracic aorta, hazy airspace opacity left lateral midlung field.  Otherwise negative.  CT abdomen pelvis 3/1/2024-few small partially exophytic cysts left kidney, small umbilical hernia, small right femoral hernia, small left elbow hernia containing fat.  Otherwise negative.  Pulmonology-Dr. Colon- follow up 2 weeks. Was not seen by him in hospital.   Drinking 4-5 bottles of water daily.      Patient's Specialists:  Cardiology- Dr Ziegler, Pretty Mosocso, APRN-last appointment 7/2024 for precordial pain, hypertension, dyspnea on exertion, MCKENZIE, hyperlipidemia.  He had elevated troponin in the setting of pneumonia with normal nuclear stress test 9/2023.  Echo 9/2023 with normal function of her heart.  No 4/2024 with unremarkable troponin.  No current chest pain.  Follow-up 6 months.  7/2023-ordered Lexiscan nuclear stress test.  Advised he  could take the lisinopril 5 mg twice daily.  Echo with normal LV function and some age-related changes to aortic valve but everything else seem to be okay.  Negative stress test.  Advised to let her know if any worsening symptoms.  Endocrinology- SHANT Watts-last appointment 6/2024 for hypothyroidism, vitamin D deficiency, dyslipidemia.  Stop Synthroid 112 mcg and given 100 mcg every day.  Continue statin.  Repeat labs 3 months.  Prior change Synthroid to 100 mcg 5 days a week and 112 2 days a week.  Change Livalo to lovastatin since insurance is not covering Livalo. Continue vitamin D 50,000 IU once weekly. Gave glucometer to check glucose after meals if fatigued. Follow-up in 1 year.  Prior SHANT Hawthorne/ Dr West- last appt 12/2018- treating for hypothyroidism, lipid, and vitamin d. Last labs 3/2019- follow up labs 5/20/19 and 7/2019 and follow up with Dr West 7/2019  GI-Dr. Markos Jimenez, SHANT Pereyra-last appointment 6/2024 for GERD and upper abdominal pain.  Discussed recommendation to proceed with pantoprazole 40 mg daily and gastric emptying study to assess for delayed gastric emptying.  He wanted to postpone gastric emptying study but is agreeable to proceed with ultrasound to assess for biliary etiology.  Provided samples of FD guard-1 to 2 capsules prior to meals.  Encouraged smaller, more frequent meals.  Also try diane root over-the-counter supplement 550 mg prior to meals.  Follow-up 4 weeks and if persistent symptoms, consideration of GES testing as well as consider updating EGD.  10/2022 for unexplained epigastric 10 left upper quadrant pain and due for colon cancer screening.  Noted bloating, early satiety.  Advised pantoprazole 40 mg daily, gastric emptying study, colonoscopy and EGD.  After EGD- treated H. pylori with Protonix 40 mg twice daily for 14 days then once daily, Flagyl 250 mg, tetracycline, bismuth.  Follow-up with nurse practitioner in 4 weeks.  Colonoscopy  and EGD performed 12/2022-biopsy positive for H. pylori and tubulovillous adenoma.  Treated and advised recheck urease breath test or stool antigen in 8 weeks, repeat colonoscopy 3 years.    Neurosurgery-Dr. Barbie Hou-last appointment 12/2023 for cervical disc herniation C6-7 with cervical myelopathy.  Discussed severity of disc herniation and compression of the cord with signs of cervical myelopathy including positive Mag sign, bilateral hyperreflexia and tricep reflexes, radiculopathy with pain and tingling but no weakness at the elbow extension weakness with shoulder abduction that could be related to a shoulder injury.  Recommended surgical intervention with discectomy C6-7 and fusion.  Ordered CT scan to evaluate calcification anteriorly.  He was undecided despite strong recommendation for surgery and wanted to wait and see surgeon in early January after CT.  No CT results in the chart.  No follow-up.  Orthopedic surgery-Dr. Clay Santos-last appointment 10/2023 for weakness right upper limb, pain in right shoulder, neck pain.  Appeared he had C5 versus C6 nerve root issue with weakness of the deltoid biceps and supraspinatus and infraspinatus.  Advised MRI cervical spine and follow-up with neurosurgery.  With significant weakness in the right upper extremity, concern for nerve root distribution C5 versus C6.  Continue muscle relaxer and Tylenol.  I also ordered EMG/NCS right upper extremity to include the supraspinatus and infraspinatus as well as deltoid and biceps as well as the remainder of the upper extremity.  Given Medrol Dosepak and ibuprofen 800 mg.  Pulmonology- Dr Colon- last appt in follow-up of emphysema, pulmonary nodules, bronchiectasis, and periodic limb movement disorder.  CT 5/2021 was stable from 12/2019  They have discussed lung transplant consultation, however, patient does not want to have lung transplant and will not see the specialist at this time.changed Trelegy to  Breztri. No other changes. Follow up 3 months.       The following portions of the patient's history were reviewed and updated as appropriate: allergies, current medications, past family history, past medical history, past social history, past surgical history and problem list.    Review of Systems   Respiratory:  Positive for shortness of breath.    Cardiovascular:  Positive for chest pain.   Gastrointestinal:  Positive for abdominal pain.   Neurological:  Positive for numbness.   Hematological:  Bruises/bleeds easily.   All other systems reviewed and are negative.    Objective    Vitals:    08/27/24 1313   BP: 118/72   Pulse: 58   Resp: 17   Temp: 97.1 °F (36.2 °C)   SpO2: 94%     Body mass index is 26.57 kg/m².     Physical Exam   Constitutional: He is oriented to person, place, and time. He appears well-developed. No distress.   HENT:   Head: Normocephalic and atraumatic.   Right Ear: External ear normal.   Left Ear: External ear normal.   Eyes: Conjunctivae are normal.   Neck: Carotid bruit is not present. No tracheal deviation present. No thyroid mass and no thyromegaly present.   Cardiovascular: Normal rate, regular rhythm, normal heart sounds and normal pulses.   Pulmonary/Chest: Effort normal and breath sounds normal.   Abdominal: Normal appearance.   Neurological: He is alert and oriented to person, place, and time. Gait normal.   Skin: Skin is warm and dry.   Psychiatric: His behavior is normal. Mood, judgment and thought content normal.   Nursing note and vitals reviewed.      Assessment & Plan   Diagnoses and all orders for this visit:    1. Neoplasm of uncertain behavior of skin (Primary)  -     Ambulatory Referral to Dermatology    2. Leukocytosis, unspecified type  -     CBC & Differential    3. Macrocytosis  -     CBC & Differential    4. Primary hypertension    5. Mixed anxiety depressive disorder    6. Panic attack    7. Epigastric pain    8. Gastroesophageal reflux disease, unspecified whether  esophagitis present    9. Gastrointestinal intolerance to foods    10. Acquired hypothyroidism    11. Goiter    12. Other hyperlipidemia    13. Vitamin D deficiency    14. Hyperglycemia    15. Chronic obstructive pulmonary disease, unspecified COPD type    16. Bronchiectasis with acute lower respiratory infection    17. Hypoxia    18. Acute on chronic respiratory failure with hypoxia    19. Oxygen dependent    20. Pulmonary nodules    21. Shortness of breath    22. MCKENZIE (obstructive sleep apnea)    23. Benign prostatic hyperplasia without lower urinary tract symptoms          Assessment and Plan  I reviewed endocrinology records and hospital labs.  Persistently elevated WBC.  I will recheck CBC when he completes labs for cardiology.  Follow-up with me in 6 months for AWV and follow-up of all conditions and specialists.    Chest pain, labile blood pressure-Patient was seen in the emergency department 7/2023 for chest pain with radiation into his shoulders and elevated blood pressure pain was associated with diaphoresis.  He has history of left chest pain then had episode of pain with radiation to bilateral shoulders.  He was seen by cardiology and underwent stress test and echocardiogram that were negative with the exception of some valvular issues.  Continue follow-up with cardiology as directed by them.  Hypertension- Patient to continue medications, monitoring, and follow-up with cardiology as directed by them.   Leukocytosis- He was told this was from chronic prednisone use.  This is definitely a possibility.  I will continue to monitor and make recommendations.  Macrocytosis- Recheck and B12 and folate levels were ok. I will continue to monitor.  Anxiety, adjustment reaction, grief reaction-Patient with episodes of anxiety. He has not necessarily wanted daily medication but something to take if he has an increased anxiety day. Patient was given Atarax as needed. He understands that he cannot drive, lift, work on  medication. He should let me know if he is using frequently and we will need to consider daily medication. He was previously on Celexa daily- we could consider this.he has had increased stress with custody of his 5 grandchildren and caring for his wife who had previous CVA and was paralyzed.  However, she then passed away Easter 2023.  He also had grief reaction from the death of his father.  I discussed with patient consideration of therapy/counseling and medication.  He will let me know if he is willing to consider this.  Skin lesions-He has skin lesions on his arms for months.  He reports his brother had similar lesions and had skin cancer.  There are numerous lesions that are difficult to determine.  There may be some actinic keratosis, however, there is also some component of trauma/picking.  I gave betamethasone to try on some of these areas and referred to dermatology for evaluation and further treatment.  He reports he never received dermatology appointment.  He now has a significant, ulcerated lesion on the right side of his nose.  I will refer again to dermatology and advised he contact us in 1 week if he has not received appointment.  GERD, epigastric pain, LUQ abdominal pain- Patient had improvement in symptoms but has had significant worsening again.  He did have improvement in GI symptoms with Protonix in the hospital.  Continue Protonix 40 mg once daily and follow-up and further workup with GI as directed by them.    Influenza A, acute on chronic respiratory failure with hypoxia, pneumonia, hyponatremia, elevated troponin, elevated liver function test, elevated BUN, abnormal urinalysis- He will continue follow-up with pulmonology.  COPD, pulmonary nodules, bronchiectasis, PLMD- Continue follow up with pulmonology and treatment as directed by them.   Cervical disc disorder with radiculopathy C6-7, weakness, paresthesias/ neuropathy- B12/ folate levels were ok.  We discussed additional workup at visit  in the past.  He is now seeing orthopedist who ordered cervical imaging and sent to neurosurgery.  They have recommended cervical intervention with discectomy C6-7 and fusion.  They ordered CT scan to evaluate and advise surgery.  He wanted to wait until January and see them in follow-up but did not return in January.  He does have compression of the cord with signs of cervical myelopathy including positive Mag sign, bilateral hyperreflexia triceps reflex, radiculopathy with pain and tingling but no weakness with elbow extension, weakness shoulder abduction.  Patient should follow-up with neurosurgery as directed by them.     I spent 30 minutes caring for Nguyễn Santana on this date of service. This time includes time spent by me in the following activities as necessary: preparing for the visit, reviewing tests, specialists records and previous visits, obtaining and/or reviewing a separately obtained history, performing a medically appropriate exam and/or evaluation, counseling and educating the patient, family, caregiver, referring and/or communicating with other healthcare professionals, documenting information in the medical record, independently interpreting results and communicating that information with the patient, family, caregiver, and developing a medically appropriate treatment plan with consideration of other conditions, medications, and treatments.                 (3) no apparent problem

## 2024-10-02 NOTE — OB PROVIDER TRIAGE NOTE - HISTORY OF PRESENT ILLNESS
[FreeTextEntry1] : Patient seen and discussed etiology.  Patient aware of importance of stretching and directed how.  The patient received a cortisone injection with 0.25% Marcaine 1% lidocaine and dexamethasone phosphate to the left medial heel.  Prescription given for meloxicam 7.5 mg twice daily she will follow-up with me in 2 weeks 31yo  at 38w3d presenting for poorly controlled blood glucose at home. Pt states that she had a burger noble burger with Sami fried on Friday night and ever since then her sugars were poorly controlled. She also stopped taking her insulin with meals after friday as well. Yesterday (Sat) it was 240 after breakfast, then 90 1hr later then 70 - when she was shaking uncontrollably and had to drink half a bottle of Gatorade. This morning her sugar was 68 and she was unable to get out of bed. Pt reports some nausea and one episode of diarrhea earlier in the week. Denies dizziness, fevers, headache, visual disturbances, CP, SOB, abdominal pain and edema.     Denies ctx, LOF, VB, dec FM. GBS negative, EFW 7# on Monday  PNC: c/b GDMA2 (48 Humalin qhs, Humalog 14 with meals)  POB:   8#5, 2016  7#11, 2017  7#7 c/b gHTN. D+C 2020, SAB 2020  GYN denies fibroids, cysts, ab paps, STIs  PMH denies  PSH: D+C, vocal cord polyp at age 1  All: NKDA, tree nuts  Meds: Zoloft 25, 48 Humalin qhs, Humalog 14 with meals, ASA, Fe, Vit C  Shx: denies toxic habits. hx of anxiety - hospitalized in October for panic attack and started on Zoloft in January after diagnosis of GDM. Pt was prescribed a dose of 50 of Zoloft but reports being "too anxious to take her anxiety meds" so only takes 25 daily.